# Patient Record
Sex: MALE | Race: BLACK OR AFRICAN AMERICAN | NOT HISPANIC OR LATINO | URBAN - METROPOLITAN AREA
[De-identification: names, ages, dates, MRNs, and addresses within clinical notes are randomized per-mention and may not be internally consistent; named-entity substitution may affect disease eponyms.]

---

## 2023-07-27 ENCOUNTER — INPATIENT (INPATIENT)
Facility: HOSPITAL | Age: 48
LOS: 5 days | Discharge: HOME HEALTH | DRG: 477 | End: 2023-08-02
Attending: HOSPITALIST | Admitting: INTERNAL MEDICINE
Payer: MEDICAID

## 2023-07-27 VITALS
TEMPERATURE: 98 F | HEIGHT: 78 IN | HEART RATE: 102 BPM | WEIGHT: 250 LBS | RESPIRATION RATE: 18 BRPM | OXYGEN SATURATION: 96 % | SYSTOLIC BLOOD PRESSURE: 96 MMHG | DIASTOLIC BLOOD PRESSURE: 61 MMHG

## 2023-07-27 LAB
ALBUMIN SERPL ELPH-MCNC: 3.1 G/DL — LOW (ref 3.4–5)
ALP SERPL-CCNC: 95 U/L — SIGNIFICANT CHANGE UP (ref 40–120)
ALT FLD-CCNC: 10 U/L — LOW (ref 12–42)
ANION GAP SERPL CALC-SCNC: 15 MMOL/L — SIGNIFICANT CHANGE UP (ref 9–16)
AST SERPL-CCNC: 21 U/L — SIGNIFICANT CHANGE UP (ref 15–37)
BASOPHILS # BLD AUTO: 0.04 K/UL — SIGNIFICANT CHANGE UP (ref 0–0.2)
BASOPHILS NFR BLD AUTO: 0.4 % — SIGNIFICANT CHANGE UP (ref 0–2)
BILIRUB SERPL-MCNC: 0.7 MG/DL — SIGNIFICANT CHANGE UP (ref 0.2–1.2)
BUN SERPL-MCNC: 25 MG/DL — HIGH (ref 7–23)
CALCIUM SERPL-MCNC: 8.9 MG/DL — SIGNIFICANT CHANGE UP (ref 8.5–10.5)
CHLORIDE SERPL-SCNC: 102 MMOL/L — SIGNIFICANT CHANGE UP (ref 96–108)
CO2 SERPL-SCNC: 18 MMOL/L — LOW (ref 22–31)
CREAT SERPL-MCNC: 1.66 MG/DL — HIGH (ref 0.5–1.3)
CRP SERPL-MCNC: 32 MG/L — HIGH (ref 0–9)
EGFR: 50 ML/MIN/1.73M2 — LOW
EOSINOPHIL # BLD AUTO: 0.11 K/UL — SIGNIFICANT CHANGE UP (ref 0–0.5)
EOSINOPHIL NFR BLD AUTO: 1.2 % — SIGNIFICANT CHANGE UP (ref 0–6)
GLUCOSE SERPL-MCNC: 237 MG/DL — HIGH (ref 70–99)
HCT VFR BLD CALC: 35.9 % — LOW (ref 39–50)
HGB BLD-MCNC: 12.3 G/DL — LOW (ref 13–17)
IMM GRANULOCYTES NFR BLD AUTO: 0.3 % — SIGNIFICANT CHANGE UP (ref 0–0.9)
LACTATE BLDV-MCNC: 1.6 MMOL/L — SIGNIFICANT CHANGE UP (ref 0.5–2)
LYMPHOCYTES # BLD AUTO: 2.35 K/UL — SIGNIFICANT CHANGE UP (ref 1–3.3)
LYMPHOCYTES # BLD AUTO: 26.4 % — SIGNIFICANT CHANGE UP (ref 13–44)
MCHC RBC-ENTMCNC: 31.7 PG — SIGNIFICANT CHANGE UP (ref 27–34)
MCHC RBC-ENTMCNC: 34.3 GM/DL — SIGNIFICANT CHANGE UP (ref 32–36)
MCV RBC AUTO: 92.5 FL — SIGNIFICANT CHANGE UP (ref 80–100)
MONOCYTES # BLD AUTO: 0.7 K/UL — SIGNIFICANT CHANGE UP (ref 0–0.9)
MONOCYTES NFR BLD AUTO: 7.9 % — SIGNIFICANT CHANGE UP (ref 2–14)
NEUTROPHILS # BLD AUTO: 5.67 K/UL — SIGNIFICANT CHANGE UP (ref 1.8–7.4)
NEUTROPHILS NFR BLD AUTO: 63.8 % — SIGNIFICANT CHANGE UP (ref 43–77)
NRBC # BLD: 0 /100 WBCS — SIGNIFICANT CHANGE UP (ref 0–0)
PLATELET # BLD AUTO: 217 K/UL — SIGNIFICANT CHANGE UP (ref 150–400)
POTASSIUM SERPL-MCNC: 4.5 MMOL/L — SIGNIFICANT CHANGE UP (ref 3.5–5.3)
POTASSIUM SERPL-SCNC: 4.5 MMOL/L — SIGNIFICANT CHANGE UP (ref 3.5–5.3)
PROT SERPL-MCNC: 8.5 G/DL — HIGH (ref 6.4–8.2)
RBC # BLD: 3.88 M/UL — LOW (ref 4.2–5.8)
RBC # FLD: 11.9 % — SIGNIFICANT CHANGE UP (ref 10.3–14.5)
SODIUM SERPL-SCNC: 135 MMOL/L — SIGNIFICANT CHANGE UP (ref 132–145)
WBC # BLD: 8.9 K/UL — SIGNIFICANT CHANGE UP (ref 3.8–10.5)
WBC # FLD AUTO: 8.9 K/UL — SIGNIFICANT CHANGE UP (ref 3.8–10.5)

## 2023-07-27 PROCEDURE — 73630 X-RAY EXAM OF FOOT: CPT | Mod: 26,RT

## 2023-07-27 PROCEDURE — 99223 1ST HOSP IP/OBS HIGH 75: CPT

## 2023-07-27 PROCEDURE — 93971 EXTREMITY STUDY: CPT | Mod: 26,RT

## 2023-07-27 PROCEDURE — 99285 EMERGENCY DEPT VISIT HI MDM: CPT

## 2023-07-27 RX ORDER — PIPERACILLIN AND TAZOBACTAM 4; .5 G/20ML; G/20ML
4.5 INJECTION, POWDER, LYOPHILIZED, FOR SOLUTION INTRAVENOUS ONCE
Refills: 0 | Status: COMPLETED | OUTPATIENT
Start: 2023-07-27 | End: 2023-07-27

## 2023-07-27 RX ORDER — SODIUM CHLORIDE 9 MG/ML
1000 INJECTION, SOLUTION INTRAVENOUS
Refills: 0 | Status: DISCONTINUED | OUTPATIENT
Start: 2023-07-27 | End: 2023-08-02

## 2023-07-27 RX ORDER — DEXTROSE 50 % IN WATER 50 %
15 SYRINGE (ML) INTRAVENOUS ONCE
Refills: 0 | Status: DISCONTINUED | OUTPATIENT
Start: 2023-07-27 | End: 2023-08-02

## 2023-07-27 RX ORDER — ACETAMINOPHEN 500 MG
650 TABLET ORAL ONCE
Refills: 0 | Status: COMPLETED | OUTPATIENT
Start: 2023-07-27 | End: 2023-07-27

## 2023-07-27 RX ORDER — DEXTROSE 50 % IN WATER 50 %
12.5 SYRINGE (ML) INTRAVENOUS ONCE
Refills: 0 | Status: DISCONTINUED | OUTPATIENT
Start: 2023-07-27 | End: 2023-08-02

## 2023-07-27 RX ORDER — SODIUM CHLORIDE 9 MG/ML
2800 INJECTION INTRAMUSCULAR; INTRAVENOUS; SUBCUTANEOUS ONCE
Refills: 0 | Status: COMPLETED | OUTPATIENT
Start: 2023-07-27 | End: 2023-07-27

## 2023-07-27 RX ORDER — DEXTROSE 50 % IN WATER 50 %
25 SYRINGE (ML) INTRAVENOUS ONCE
Refills: 0 | Status: DISCONTINUED | OUTPATIENT
Start: 2023-07-27 | End: 2023-08-02

## 2023-07-27 RX ORDER — KETOROLAC TROMETHAMINE 30 MG/ML
15 SYRINGE (ML) INJECTION ONCE
Refills: 0 | Status: DISCONTINUED | OUTPATIENT
Start: 2023-07-27 | End: 2023-07-27

## 2023-07-27 RX ORDER — INSULIN GLARGINE 100 [IU]/ML
50 INJECTION, SOLUTION SUBCUTANEOUS AT BEDTIME
Refills: 0 | Status: DISCONTINUED | OUTPATIENT
Start: 2023-07-27 | End: 2023-07-29

## 2023-07-27 RX ORDER — INSULIN LISPRO 100/ML
VIAL (ML) SUBCUTANEOUS
Refills: 0 | Status: DISCONTINUED | OUTPATIENT
Start: 2023-07-27 | End: 2023-08-02

## 2023-07-27 RX ORDER — VANCOMYCIN HCL 1 G
1500 VIAL (EA) INTRAVENOUS ONCE
Refills: 0 | Status: COMPLETED | OUTPATIENT
Start: 2023-07-27 | End: 2023-07-27

## 2023-07-27 RX ORDER — GLUCAGON INJECTION, SOLUTION 0.5 MG/.1ML
1 INJECTION, SOLUTION SUBCUTANEOUS ONCE
Refills: 0 | Status: DISCONTINUED | OUTPATIENT
Start: 2023-07-27 | End: 2023-08-02

## 2023-07-27 RX ORDER — LOSARTAN POTASSIUM 100 MG/1
25 TABLET, FILM COATED ORAL ONCE
Refills: 0 | Status: COMPLETED | OUTPATIENT
Start: 2023-07-27 | End: 2023-07-27

## 2023-07-27 RX ORDER — LOSARTAN POTASSIUM 100 MG/1
25 TABLET, FILM COATED ORAL EVERY 24 HOURS
Refills: 0 | Status: DISCONTINUED | OUTPATIENT
Start: 2023-07-28 | End: 2023-07-31

## 2023-07-27 RX ADMIN — Medication 15 MILLIGRAM(S): at 15:02

## 2023-07-27 RX ADMIN — SODIUM CHLORIDE 2800 MILLILITER(S): 9 INJECTION INTRAMUSCULAR; INTRAVENOUS; SUBCUTANEOUS at 15:01

## 2023-07-27 RX ADMIN — Medication 300 MILLIGRAM(S): at 16:18

## 2023-07-27 RX ADMIN — PIPERACILLIN AND TAZOBACTAM 200 GRAM(S): 4; .5 INJECTION, POWDER, LYOPHILIZED, FOR SOLUTION INTRAVENOUS at 15:39

## 2023-07-27 RX ADMIN — Medication 650 MILLIGRAM(S): at 15:01

## 2023-07-27 NOTE — ED ADULT NURSE REASSESSMENT NOTE - NS ED NURSE REASSESS COMMENT FT1
Dr. Law updated on pt's BP. Pt denies hx of HTN, denies pain at this time. Pt states he's upset about being in hospital.

## 2023-07-27 NOTE — H&P ADULT - ASSESSMENT
8M with pmhx of HIV (compliant with Biktarvy), partial great toe amputation in Jan 2022, recent R great toe OM in May (s/p 6 week course of Cefepime and Vancomycin), DM, HTN who presents for a 1 day history of right foot pain and swelling admitted for R toe osteomyelitis.  48M with pmhx of HIV (compliant with Biktarvy), partial great toe amputation in Jan 2022, recent R great toe OM in May (s/p 6 week course of Cefepime and Vancomycin), DM, HTN who presents for a 1 day history of right foot pain and swelling admitted for R toe osteomyelitis.

## 2023-07-27 NOTE — H&P ADULT - NSHPPHYSICALEXAM_GEN_ALL_CORE
.  VITAL SIGNS:  T(C): 36.7 (07-27-23 @ 23:13), Max: 36.9 (07-27-23 @ 11:43)  T(F): 98.1 (07-27-23 @ 23:13), Max: 98.4 (07-27-23 @ 11:43)  HR: 87 (07-28-23 @ 00:19) (80 - 102)  BP: 152/86 (07-28-23 @ 00:19) (96/61 - 178/96)  BP(mean): --  RR: 18 (07-27-23 @ 23:13) (16 - 18)  SpO2: 99% (07-27-23 @ 23:13) (96% - 99%)  Wt(kg): --    PHYSICAL EXAM:    Constitutional: WDWN resting comfortably in bed; NAD  Head: NC/AT  Eyes: PERRL, EOMI  ENT: no nasal discharge; no oropharyngeal erythema or exudates; MMM  Neck: supple; no JVD  Respiratory: CTA B/L; no W/R/R  Cardiac: +S1/S2; RRR; no M/R/G  Gastrointestinal: abdomen soft, NT/ND; no rebound or guarding; +BSx4  Extremities: WWP, no peripheral edema. distal R great toe s/p amputation. 3cm wound to the plantar aspect, no purulence, mild erythema  Musculoskeletal: NROM x4  Vascular: 2+ radial, 1+ DP/PT pulses B/L  Neurologic: AAOx3; CNII-XII grossly intact; no focal deficits  Psychiatric: affect and characteristics of appearance, verbalizations, behaviors are appropriate

## 2023-07-27 NOTE — ED PROVIDER NOTE - PROGRESS NOTE DETAILS
XR R foot with bony erosion of great toe consistent with likely osteo.  CRP elevated.  Pt on vanc/zosyn.  Will admit to medicine at .

## 2023-07-27 NOTE — H&P ADULT - ATTENDING COMMENTS
47 yo M with PMHx DM, HIV, HTN, HLD, s/p partial R toe amputation (c/b OM 5/2023) px to [facility] from home with worsening R 1st digit ulcer of 3 month duration s/p Bactrim outpatient admitted with concern for possible osteomyelitis.     #R toe osteomyelitis – Only meeting 1/4 SIRS for HR. CRP elevated to 32. Recent tx of OM completed with Bactrim 1 week prior. On my exam, 2x2 cm circular ulcer on plantar aspect of R 1st digit without drainage or visible bone however XR R foot showing erosive changes of the proximal phalanx concerning for osteomyelitis. Like chronic osteomyelitis. Podiatry consulted. F/U MR R-foot. Continue Vanc/Zosyn pending further podiatry recommendations. F/U ESR/CRP with next labs.      Agree with remainder of resident plan as above.

## 2023-07-27 NOTE — ED ADULT NURSE NOTE - OBJECTIVE STATEMENT
Pt with wound/infection to bottom of right 1st toe. Reports he has been on multiple rounds of abx requiring a PICC line for long term meds.

## 2023-07-27 NOTE — ED PROVIDER NOTE - PHYSICAL EXAMINATION
Constitutional: awake and alert, in no acute distress  HEENT: head normocephalic and atraumatic. moist mucous membranes  Eyes: extraocular movements intact, normal conjunctiva  Neck: supple, normal ROM  Cardiovascular: regular rate   Pulmonary: no respiratory distress  Gastrointestinal: abdomen flat and nondistended  Skin: warm, dry, normal for ethnicity  Musculoskeletal: erythema, swelling, and warmth of right lower extremity to mid-calf. 3 cm x 3 cm ulcer to base of right great toe stub without foul smelling discharge. S/p partial right great toe amputation.  Neurological: oriented x4, no focal neurologic deficit.   Psychiatric: calm and cooperative

## 2023-07-27 NOTE — PATIENT PROFILE ADULT - FALL HARM RISK - UNIVERSAL INTERVENTIONS
Bed in lowest position, wheels locked, appropriate side rails in place/Call bell, personal items and telephone in reach/Instruct patient to call for assistance before getting out of bed or chair/Non-slip footwear when patient is out of bed/[place] to call system/Physically safe environment - no spills, clutter or unnecessary equipment/Purposeful Proactive Rounding/Room/bathroom lighting operational, light cord in reach

## 2023-07-27 NOTE — H&P ADULT - NSHPLABSRESULTS_GEN_ALL_CORE
.  LABS:                         12.3   8.90  )-----------( 217      ( 27 Jul 2023 13:50 )             35.9     07-27    135  |  102  |  25<H>  ----------------------------<  237<H>  4.5   |  18<L>  |  1.66<H>    Ca    8.9      27 Jul 2023 13:50    TPro  8.5<H>  /  Alb  3.1<L>  /  TBili  0.7  /  DBili  x   /  AST  21  /  ALT  10<L>  /  AlkPhos  95  07-27      Urinalysis Basic - ( 27 Jul 2023 13:50 )    Color: x / Appearance: x / SG: x / pH: x  Gluc: 237 mg/dL / Ketone: x  / Bili: x / Urobili: x   Blood: x / Protein: x / Nitrite: x   Leuk Esterase: x / RBC: x / WBC x   Sq Epi: x / Non Sq Epi: x / Bacteria: x            RADIOLOGY, EKG & ADDITIONAL TESTS: Reviewed.

## 2023-07-27 NOTE — H&P ADULT - PROBLEM SELECTOR PLAN 2
Pt reports hx of diabetes, complicated with diabetic neuropathy  -uses trulicity 1.5 and Basaglar 70u at home  -f/u A1c in AM (pt does not rememeber when it was last checked)  -will start Lantus dosing at 50u and increase as needed  -mISS

## 2023-07-27 NOTE — ED PROVIDER NOTE - CLINICAL SUMMARY MEDICAL DECISION MAKING FREE TEXT BOX
49 y/o M with PMHx of HIV (viral load undetectable), history of partial great toe amputation, gout, right great toe osteomyelitis (s/p 6 weeks of IV antibiotics 2-3 months ago) presents with 2 days of right lower extremity swelling, pain, and redness. On exam, pt is afebrile, right lower extremity is erythematous, warm, and swollen compared to the left lower extremity, and right great toe is noted to have a plantar ulcer with clean edges and no foul smelling discharge. Differential includes cellulitis, osteomyelitis, DVT, other. Plan for labs, XR right foot, US right lower extremity, empiric IV antibiotics, IV hydration, analgesia, and reassess.

## 2023-07-27 NOTE — H&P ADULT - HISTORY OF PRESENT ILLNESS
48M with pmhx of HIV (compliant with Biktarvy), partial great toe amputation in Jan 2022, recent R great toe OM in May (s/p 6 week course of Cefepime and Vancomycin), DM, HTN who presents for a 1 day history of right foot pain and swelling. Pt notes he has an open wound to the R great toe that he has had for the past 3 months. He was recently admitted to a hospital in [place] for OM of the R great toe and received a 6 week course of Vanc and Cefepime. Pt reports he then followed up with his podiatrist who completed an additional xray, which showed improvement in the infection. Pt states approximately 2 weeks ago he then developed slight drainage from the wound and followed up with his Podiatrist, who prescribed a 5 day course of Bactrim, which he completed. He then performed wound care himself the past 2 weeks, however two days ago, he said the wound was exposed to water and he subsequently developed pain and swelling, which is what prompted him to come to the ED. Denies fevers, chills, chest pain, shortness of breath, abdominal pain, n/v/d, numbness/tingling in the leg. Denies any bleeding or draining from the wound over the last few days.    In the ED:  Vitals: 98.4,  --> 83, /96, 96% on RA  Labs: WBC 8.90, Hgb 12.3, Cr 1.66, CRP 32, lactate 1.6  Imaging: Xray of R foot: erosive changes of the first proximal phalanx head, concerning for OM  RLE US; negative for DVT  Interventions: Tylenol 650mg PO qD, Toradol 15mg IVP, Zosyn 4.5IV, Vanc 1500mg IV 48M with pmhx of HIV (compliant with Biktarvy), partial great toe amputation in Jan 2022, recent R great toe OM in May (s/p 6 week course of Cefepime and Vancomycin), DM, HTN who presents for a 1 day history of right foot pain and swelling. Pt notes he has an open wound to the R great toe that he has had for the past 3 months. He was recently admitted to a hospital in [place] for OM of the R great toe and received a 6 week course of Vanc and Cefepime. Pt reports he then followed up with his podiatrist who completed an additional xray, which showed improvement in the infection. Pt states approximately 2 weeks ago he then developed slight drainage from the wound and followed up with his Podiatrist, who prescribed a 5 day course of Bactrim, which he completed. He then performed wound care himself the past 2 weeks, however two days ago, he said the wound was exposed to water and he subsequently developed pain and swelling, which is what prompted him to come to the ED. Denies fevers, chills, chest pain, shortness of breath, abdominal pain, n/v/d, numbness/tingling in the leg. Denies any bleeding or draining from the wound over the last few days.    In the ED:  Vitals: 98.4,  --> 83, /96, 96% on RA  Labs: WBC 8.90, Hgb 12.3, Cr 1.66, CRP 32, lactate 1.6  Imaging: Xray of R foot: erosive changes of the first proximal phalanx head, concerning for OM  RLE US; negative for DVT  Interventions: Tylenol 650mg PO qD, Toradol 15mg IVP, Zosyn 4.5IV, Vanc 1500mg IV, NS 2.8L

## 2023-07-27 NOTE — H&P ADULT - PROBLEM SELECTOR PLAN 5
c/w Atorvastatin 20mg PO qD  -pt states he has been using a baby aspirin for many years, no known CAD. will continue inpatient

## 2023-07-27 NOTE — ED ADULT NURSE REASSESSMENT NOTE - NS ED NURSE REASSESS COMMENT FT1
Unable to obtain blood cultures; pt stuck multiple times by 2 RN's. Dr. Law notified ok with starting abx without blood cultures.

## 2023-07-27 NOTE — H&P ADULT - NSHPSOCIALHISTORY_GEN_ALL_CORE
No tobacco or illicit drug use  social alcohol use  lives at home with his mother  independent with all ADLs

## 2023-07-27 NOTE — ED ADULT TRIAGE NOTE - CHIEF COMPLAINT QUOTE
here for right lower leg pain- h/o recent partial amputation to right big toe 2 months ago- h/o dm, gout, HIV, htn and hld- states it feels like his gout

## 2023-07-27 NOTE — H&P ADULT - PROBLEM SELECTOR PLAN 3
pt reports compliance with Biktarvy, continue while inpatient  states his VL and CD4 count was last checked 3 weeks ago, patient will check for outpatient labs on the portal
Pt complaining of covid-19 infection.  Pt on day 10 of symptoms including shortness of breath, cough, body aches, mild diarrhea.  Presents today for worsening shortness of breath and cough, monitoring at home on spo2 monitor reporting 92@ at home which is consistent with readings in ER.  Pt is conversive, no overt respiratory distress, abdomen soft/non-distended/non-tender, skin warm, dry, intact, denies chest pain, abdominal pain, nausea, vomiting, urinary symptoms, falls.  Dad also in ER with covid infection.  Non-vaccinated.

## 2023-07-27 NOTE — H&P ADULT - PROBLEM SELECTOR PLAN 1
Pt presenting with a 1 day history of swelling and pain to the R toe. Reports hx of OM of R great toe (has 3 month history of open wound to the toe) and completed 6 week course of antibiotics mid June (Vanc/Cefepime). Followed with a podiatrist, who reported improvement per x-ray, however 2 weeks ago again started to have purulence/swelling and was given 5 day course of Bactrim  -does not meet SIRS criteria on admission  -s/p Vanc and Zosyn in the ED. Xray with concern for OM of proximal phalanx head  -podiatry consulted, f/u recommendations  -MR with IV contrast of R foot ordered  -c/w Vanc and Zosyn (pseudomonas dosing given hx of DM) Pt presenting with a 1 day history of swelling and pain to the R toe. Reports hx of OM of R great toe (has 3 month history of open wound to the toe) and completed 6 week course of antibiotics mid June (Vanc/Cefepime). Followed with a podiatrist, who reported improvement per x-ray, however 2 weeks ago again started to have purulence/swelling and was given 5 day course of Bactrim  -does not meet SIRS criteria on admission  -s/p Vanc and Zosyn in the ED. Xray with concern for OM of proximal phalanx head  -podiatry consulted, f/u recommendations  -f/u ESR and CRP in AM  -MR with IV contrast of R foot ordered  -c/w Vanc and Zosyn (pseudomonas dosing given hx of DM)

## 2023-07-27 NOTE — ED ADULT NURSE NOTE - NSFALLUNIVINTERV_ED_ALL_ED
Bed/Stretcher in lowest position, wheels locked, appropriate side rails in place/Call bell, personal items and telephone in reach/Instruct patient to call for assistance before getting out of bed/chair/stretcher/Non-slip footwear applied when patient is off stretcher/[place] to call system/Physically safe environment - no spills, clutter or unnecessary equipment/Purposeful proactive rounding/Room/bathroom lighting operational, light cord in reach

## 2023-07-27 NOTE — ED PROVIDER NOTE - NS ED ROS FT
Constitutional:  No fever, No chills, No night sweats  Eyes:  No visual changes, No discharge, No redness  ENMT:  No epistaxis, no nasal congestion, no throat pain, no difficulty swallowing  CV:  No chest pain, No palpitations, No peripheral edema  Resp:  No cough, No shortness of breath  GI:  No abdominal pain, No vomiting, No diarrhea  MSK:  No neck pain or stiffness, No joint swelling or pain, No back pain  Neuro: no loss of consciousness, no gait abnormality, no headache, no sensory deficits, and no weakness.  Skin: Right lower extremity swelling, pain, redness. Right great toe ulcer. No abrasions  Psych:  No known mental health issues

## 2023-07-28 DIAGNOSIS — M86.9 OSTEOMYELITIS, UNSPECIFIED: ICD-10-CM

## 2023-07-28 DIAGNOSIS — E78.5 HYPERLIPIDEMIA, UNSPECIFIED: ICD-10-CM

## 2023-07-28 DIAGNOSIS — E11.65 TYPE 2 DIABETES MELLITUS WITH HYPERGLYCEMIA: ICD-10-CM

## 2023-07-28 DIAGNOSIS — E11.9 TYPE 2 DIABETES MELLITUS WITHOUT COMPLICATIONS: ICD-10-CM

## 2023-07-28 DIAGNOSIS — B20 HUMAN IMMUNODEFICIENCY VIRUS [HIV] DISEASE: ICD-10-CM

## 2023-07-28 DIAGNOSIS — N17.9 ACUTE KIDNEY FAILURE, UNSPECIFIED: ICD-10-CM

## 2023-07-28 DIAGNOSIS — Z29.9 ENCOUNTER FOR PROPHYLACTIC MEASURES, UNSPECIFIED: ICD-10-CM

## 2023-07-28 DIAGNOSIS — I10 ESSENTIAL (PRIMARY) HYPERTENSION: ICD-10-CM

## 2023-07-28 LAB
A1C WITH ESTIMATED AVERAGE GLUCOSE RESULT: 7.2 % — HIGH (ref 4–5.6)
ALBUMIN SERPL ELPH-MCNC: 3 G/DL — LOW (ref 3.3–5)
ALP SERPL-CCNC: 80 U/L — SIGNIFICANT CHANGE UP (ref 40–120)
ALT FLD-CCNC: 8 U/L — LOW (ref 10–45)
ANION GAP SERPL CALC-SCNC: 6 MMOL/L — SIGNIFICANT CHANGE UP (ref 5–17)
AST SERPL-CCNC: 12 U/L — SIGNIFICANT CHANGE UP (ref 10–40)
BASOPHILS # BLD AUTO: 0.04 K/UL — SIGNIFICANT CHANGE UP (ref 0–0.2)
BASOPHILS NFR BLD AUTO: 0.7 % — SIGNIFICANT CHANGE UP (ref 0–2)
BILIRUB SERPL-MCNC: 0.7 MG/DL — SIGNIFICANT CHANGE UP (ref 0.2–1.2)
BUN SERPL-MCNC: 18 MG/DL — SIGNIFICANT CHANGE UP (ref 7–23)
CALCIUM SERPL-MCNC: 8.2 MG/DL — LOW (ref 8.4–10.5)
CHLORIDE SERPL-SCNC: 104 MMOL/L — SIGNIFICANT CHANGE UP (ref 96–108)
CO2 SERPL-SCNC: 24 MMOL/L — SIGNIFICANT CHANGE UP (ref 22–31)
CREAT SERPL-MCNC: 1.36 MG/DL — HIGH (ref 0.5–1.3)
CRP SERPL-MCNC: 44.6 MG/L — HIGH (ref 0–4)
EGFR: 64 ML/MIN/1.73M2 — SIGNIFICANT CHANGE UP
EOSINOPHIL # BLD AUTO: 0.18 K/UL — SIGNIFICANT CHANGE UP (ref 0–0.5)
EOSINOPHIL NFR BLD AUTO: 3.2 % — SIGNIFICANT CHANGE UP (ref 0–6)
ERYTHROCYTE [SEDIMENTATION RATE] IN BLOOD: 51 MM/HR — HIGH (ref 0–15)
ERYTHROCYTE [SEDIMENTATION RATE] IN BLOOD: 56 MM/HR — HIGH
ESTIMATED AVERAGE GLUCOSE: 160 MG/DL — HIGH (ref 68–114)
GLUCOSE BLDC GLUCOMTR-MCNC: 122 MG/DL — HIGH (ref 70–99)
GLUCOSE BLDC GLUCOMTR-MCNC: 141 MG/DL — HIGH (ref 70–99)
GLUCOSE BLDC GLUCOMTR-MCNC: 168 MG/DL — HIGH (ref 70–99)
GLUCOSE BLDC GLUCOMTR-MCNC: 173 MG/DL — HIGH (ref 70–99)
GLUCOSE BLDC GLUCOMTR-MCNC: 221 MG/DL — HIGH (ref 70–99)
GLUCOSE BLDC GLUCOMTR-MCNC: 243 MG/DL — HIGH (ref 70–99)
GLUCOSE SERPL-MCNC: 192 MG/DL — HIGH (ref 70–99)
HCT VFR BLD CALC: 33.2 % — LOW (ref 39–50)
HGB BLD-MCNC: 11.9 G/DL — LOW (ref 13–17)
IMM GRANULOCYTES NFR BLD AUTO: 0.4 % — SIGNIFICANT CHANGE UP (ref 0–0.9)
LYMPHOCYTES # BLD AUTO: 1.77 K/UL — SIGNIFICANT CHANGE UP (ref 1–3.3)
LYMPHOCYTES # BLD AUTO: 31.4 % — SIGNIFICANT CHANGE UP (ref 13–44)
MAGNESIUM SERPL-MCNC: 2 MG/DL — SIGNIFICANT CHANGE UP (ref 1.6–2.6)
MCHC RBC-ENTMCNC: 32.7 PG — SIGNIFICANT CHANGE UP (ref 27–34)
MCHC RBC-ENTMCNC: 35.8 GM/DL — SIGNIFICANT CHANGE UP (ref 32–36)
MCV RBC AUTO: 91.2 FL — SIGNIFICANT CHANGE UP (ref 80–100)
MONOCYTES # BLD AUTO: 0.46 K/UL — SIGNIFICANT CHANGE UP (ref 0–0.9)
MONOCYTES NFR BLD AUTO: 8.2 % — SIGNIFICANT CHANGE UP (ref 2–14)
NEUTROPHILS # BLD AUTO: 3.17 K/UL — SIGNIFICANT CHANGE UP (ref 1.8–7.4)
NEUTROPHILS NFR BLD AUTO: 56.1 % — SIGNIFICANT CHANGE UP (ref 43–77)
NRBC # BLD: 0 /100 WBCS — SIGNIFICANT CHANGE UP (ref 0–0)
PHOSPHATE SERPL-MCNC: 2.9 MG/DL — SIGNIFICANT CHANGE UP (ref 2.5–4.5)
PLATELET # BLD AUTO: 230 K/UL — SIGNIFICANT CHANGE UP (ref 150–400)
POTASSIUM SERPL-MCNC: 4.5 MMOL/L — SIGNIFICANT CHANGE UP (ref 3.5–5.3)
POTASSIUM SERPL-SCNC: 4.5 MMOL/L — SIGNIFICANT CHANGE UP (ref 3.5–5.3)
PROT SERPL-MCNC: 7.4 G/DL — SIGNIFICANT CHANGE UP (ref 6–8.3)
RBC # BLD: 3.64 M/UL — LOW (ref 4.2–5.8)
RBC # FLD: 11.8 % — SIGNIFICANT CHANGE UP (ref 10.3–14.5)
SODIUM SERPL-SCNC: 134 MMOL/L — LOW (ref 135–145)
URATE SERPL-MCNC: 7 MG/DL — SIGNIFICANT CHANGE UP (ref 3.4–8.8)
WBC # BLD: 5.64 K/UL — SIGNIFICANT CHANGE UP (ref 3.8–10.5)
WBC # FLD AUTO: 5.64 K/UL — SIGNIFICANT CHANGE UP (ref 3.8–10.5)

## 2023-07-28 PROCEDURE — 99233 SBSQ HOSP IP/OBS HIGH 50: CPT | Mod: GC

## 2023-07-28 RX ORDER — DULAGLUTIDE 4.5 MG/.5ML
1.5 INJECTION, SOLUTION SUBCUTANEOUS
Refills: 0 | DISCHARGE

## 2023-07-28 RX ORDER — BICTEGRAVIR SODIUM, EMTRICITABINE, AND TENOFOVIR ALAFENAMIDE FUMARATE 30; 120; 15 MG/1; MG/1; MG/1
1 TABLET ORAL EVERY 24 HOURS
Refills: 0 | Status: DISCONTINUED | OUTPATIENT
Start: 2023-07-28 | End: 2023-08-02

## 2023-07-28 RX ORDER — BICTEGRAVIR SODIUM, EMTRICITABINE, AND TENOFOVIR ALAFENAMIDE FUMARATE 30; 120; 15 MG/1; MG/1; MG/1
1 TABLET ORAL
Refills: 0 | DISCHARGE

## 2023-07-28 RX ORDER — VANCOMYCIN HCL 1 G
1750 VIAL (EA) INTRAVENOUS EVERY 12 HOURS
Refills: 0 | Status: COMPLETED | OUTPATIENT
Start: 2023-07-28 | End: 2023-07-28

## 2023-07-28 RX ORDER — HEPARIN SODIUM 5000 [USP'U]/ML
5000 INJECTION INTRAVENOUS; SUBCUTANEOUS EVERY 8 HOURS
Refills: 0 | Status: DISCONTINUED | OUTPATIENT
Start: 2023-07-28 | End: 2023-08-02

## 2023-07-28 RX ORDER — ATORVASTATIN CALCIUM 80 MG/1
1 TABLET, FILM COATED ORAL
Refills: 0 | DISCHARGE

## 2023-07-28 RX ORDER — INSULIN GLARGINE 100 [IU]/ML
70 INJECTION, SOLUTION SUBCUTANEOUS
Refills: 0 | DISCHARGE

## 2023-07-28 RX ORDER — ASPIRIN/CALCIUM CARB/MAGNESIUM 324 MG
1 TABLET ORAL
Refills: 0 | DISCHARGE

## 2023-07-28 RX ORDER — ASPIRIN/CALCIUM CARB/MAGNESIUM 324 MG
81 TABLET ORAL EVERY 24 HOURS
Refills: 0 | Status: DISCONTINUED | OUTPATIENT
Start: 2023-07-28 | End: 2023-08-02

## 2023-07-28 RX ORDER — PIPERACILLIN AND TAZOBACTAM 4; .5 G/20ML; G/20ML
4.5 INJECTION, POWDER, LYOPHILIZED, FOR SOLUTION INTRAVENOUS EVERY 8 HOURS
Refills: 0 | Status: DISCONTINUED | OUTPATIENT
Start: 2023-07-28 | End: 2023-07-31

## 2023-07-28 RX ORDER — ACETAMINOPHEN 500 MG
650 TABLET ORAL EVERY 6 HOURS
Refills: 0 | Status: DISCONTINUED | OUTPATIENT
Start: 2023-07-28 | End: 2023-08-02

## 2023-07-28 RX ADMIN — BICTEGRAVIR SODIUM, EMTRICITABINE, AND TENOFOVIR ALAFENAMIDE FUMARATE 1 TABLET(S): 30; 120; 15 TABLET ORAL at 12:54

## 2023-07-28 RX ADMIN — LOSARTAN POTASSIUM 25 MILLIGRAM(S): 100 TABLET, FILM COATED ORAL at 19:48

## 2023-07-28 RX ADMIN — Medication 81 MILLIGRAM(S): at 12:54

## 2023-07-28 RX ADMIN — INSULIN GLARGINE 50 UNIT(S): 100 INJECTION, SOLUTION SUBCUTANEOUS at 23:13

## 2023-07-28 RX ADMIN — Medication 250 MILLIGRAM(S): at 19:47

## 2023-07-28 RX ADMIN — PIPERACILLIN AND TAZOBACTAM 25 GRAM(S): 4; .5 INJECTION, POWDER, LYOPHILIZED, FOR SOLUTION INTRAVENOUS at 12:53

## 2023-07-28 RX ADMIN — HEPARIN SODIUM 5000 UNIT(S): 5000 INJECTION INTRAVENOUS; SUBCUTANEOUS at 19:48

## 2023-07-28 RX ADMIN — PIPERACILLIN AND TAZOBACTAM 25 GRAM(S): 4; .5 INJECTION, POWDER, LYOPHILIZED, FOR SOLUTION INTRAVENOUS at 02:12

## 2023-07-28 RX ADMIN — Medication 250 MILLIGRAM(S): at 06:34

## 2023-07-28 RX ADMIN — Medication 2: at 19:19

## 2023-07-28 RX ADMIN — INSULIN GLARGINE 50 UNIT(S): 100 INJECTION, SOLUTION SUBCUTANEOUS at 00:45

## 2023-07-28 RX ADMIN — PIPERACILLIN AND TAZOBACTAM 25 GRAM(S): 4; .5 INJECTION, POWDER, LYOPHILIZED, FOR SOLUTION INTRAVENOUS at 23:13

## 2023-07-28 RX ADMIN — HEPARIN SODIUM 5000 UNIT(S): 5000 INJECTION INTRAVENOUS; SUBCUTANEOUS at 12:54

## 2023-07-28 RX ADMIN — LOSARTAN POTASSIUM 25 MILLIGRAM(S): 100 TABLET, FILM COATED ORAL at 00:34

## 2023-07-28 NOTE — PROGRESS NOTE ADULT - ASSESSMENT
48M with pmhx of HIV (compliant with Biktarvy), partial great toe amputation in Jan 2022, recent R great toe OM in May (s/p 6 week course of Cefepime and Vancomycin), DM, HTN who presents for a 1 day history of right foot pain and swelling admitted for R toe osteomyelitis.

## 2023-07-28 NOTE — PROGRESS NOTE ADULT - SUBJECTIVE AND OBJECTIVE BOX
OVERNIGHT EVENTS:    SUBJECTIVE / INTERVAL HPI: Patient seen and examined at bedside.     VITAL SIGNS:  Vital Signs Last 24 Hrs  T(C): 36.8 (28 Jul 2023 05:33), Max: 36.9 (27 Jul 2023 11:43)  T(F): 98.3 (28 Jul 2023 05:33), Max: 98.4 (27 Jul 2023 11:43)  HR: 89 (28 Jul 2023 05:33) (80 - 102)  BP: 141/93 (28 Jul 2023 05:33) (96/61 - 178/96)  BP(mean): --  RR: 18 (28 Jul 2023 05:33) (16 - 18)  SpO2: 98% (28 Jul 2023 05:33) (96% - 99%)    Parameters below as of 27 Jul 2023 23:13  Patient On (Oxygen Delivery Method): room air        PHYSICAL EXAM:    General: NAD  HEENT: NC/AT; PERRL, anicteric sclera; MMM  Neck: supple w/o palpable nodularity  Cardiovascular: +S1/S2; RRR  Respiratory: CTA B/L; no W/R/R  Gastrointestinal: soft, NT/ND; +BSx4  Extremities: WWP; no edema, clubbing or cyanosis; 2x2cm ulcer on plantar surface of R great toe - no drainage, purulence or foul-smelling odor.   Vascular: 2+ radial, 1+ DP/PT pulses B/L  Neurological: AAOx3; no focal deficits    MEDICATIONS:  MEDICATIONS  (STANDING):  aspirin  chewable 81 milliGRAM(s) Oral every 24 hours  bictegravir 50 mG/emtricitabine 200 mG/tenofovir alafenamide 25 mG (BIKTARVY) 1 Tablet(s) Oral every 24 hours  dextrose 5%. 1000 milliLiter(s) (50 mL/Hr) IV Continuous <Continuous>  dextrose 5%. 1000 milliLiter(s) (100 mL/Hr) IV Continuous <Continuous>  dextrose 50% Injectable 25 Gram(s) IV Push once  dextrose 50% Injectable 12.5 Gram(s) IV Push once  dextrose 50% Injectable 25 Gram(s) IV Push once  glucagon  Injectable 1 milliGRAM(s) IntraMuscular once  heparin   Injectable 5000 Unit(s) SubCutaneous every 8 hours  insulin glargine Injectable (LANTUS) 50 Unit(s) SubCutaneous at bedtime  insulin lispro (ADMELOG) corrective regimen sliding scale   SubCutaneous Before meals and at bedtime  losartan 25 milliGRAM(s) Oral every 24 hours  piperacillin/tazobactam IVPB.. 4.5 Gram(s) IV Intermittent every 8 hours  vancomycin  IVPB 1750 milliGRAM(s) IV Intermittent every 12 hours    MEDICATIONS  (PRN):  acetaminophen     Tablet .. 650 milliGRAM(s) Oral every 6 hours PRN Mild Pain (1 - 3)  dextrose Oral Gel 15 Gram(s) Oral once PRN Blood Glucose LESS THAN 70 milliGRAM(s)/deciliter      ALLERGIES:  Allergies    shellfish (Short breath)  doxycycline (Diarrhea)    Intolerances        LABS:                        11.9   5.64  )-----------( 230      ( 28 Jul 2023 05:30 )             33.2     07-28    134<L>  |  104  |  18  ----------------------------<  192<H>  4.5   |  24  |  1.36<H>    Ca    8.2<L>      28 Jul 2023 05:30  Phos  2.9     07-28  Mg     2.0     07-28    TPro  7.4  /  Alb  3.0<L>  /  TBili  0.7  /  DBili  x   /  AST  12  /  ALT  8<L>  /  AlkPhos  80  07-28      Urinalysis Basic - ( 28 Jul 2023 05:30 )    Color: x / Appearance: x / SG: x / pH: x  Gluc: 192 mg/dL / Ketone: x  / Bili: x / Urobili: x   Blood: x / Protein: x / Nitrite: x   Leuk Esterase: x / RBC: x / WBC x   Sq Epi: x / Non Sq Epi: x / Bacteria: x      CAPILLARY BLOOD GLUCOSE      POCT Blood Glucose.: 243 mg/dL (28 Jul 2023 00:24)      RADIOLOGY & ADDITIONAL TESTS: Reviewed.   OVERNIGHT EVENTS: No acute events overnight.     SUBJECTIVE / INTERVAL HPI: Patient seen and examined at bedside. He denies any foot pain, shortness of breath, abdominal pain.    VITAL SIGNS:  Vital Signs Last 24 Hrs  T(C): 36.8 (28 Jul 2023 05:33), Max: 36.9 (27 Jul 2023 11:43)  T(F): 98.3 (28 Jul 2023 05:33), Max: 98.4 (27 Jul 2023 11:43)  HR: 89 (28 Jul 2023 05:33) (80 - 102)  BP: 141/93 (28 Jul 2023 05:33) (96/61 - 178/96)  BP(mean): --  RR: 18 (28 Jul 2023 05:33) (16 - 18)  SpO2: 98% (28 Jul 2023 05:33) (96% - 99%)    Parameters below as of 27 Jul 2023 23:13  Patient On (Oxygen Delivery Method): room air        PHYSICAL EXAM:    General: NAD  HEENT: NC/AT; PERRL, anicteric sclera; MMM  Neck: supple w/o palpable nodularity  Cardiovascular: +S1/S2; RRR  Respiratory: CTA B/L; no W/R/R  Gastrointestinal: soft, NT/ND; +BSx4  Extremities: R distal great toe s/p amputation. R proximal great toe wrapped, unable to visualize ulcer. R foot warm, mild edema, no erythema, slight serosanginous drainage. L lower extremity unremarkable.  Vascular: 2+ radial, 1+ DP/PT pulses B/L  Neurological: AAOx3; no focal deficits    MEDICATIONS:  MEDICATIONS  (STANDING):  aspirin  chewable 81 milliGRAM(s) Oral every 24 hours  bictegravir 50 mG/emtricitabine 200 mG/tenofovir alafenamide 25 mG (BIKTARVY) 1 Tablet(s) Oral every 24 hours  dextrose 5%. 1000 milliLiter(s) (50 mL/Hr) IV Continuous <Continuous>  dextrose 5%. 1000 milliLiter(s) (100 mL/Hr) IV Continuous <Continuous>  dextrose 50% Injectable 25 Gram(s) IV Push once  dextrose 50% Injectable 12.5 Gram(s) IV Push once  dextrose 50% Injectable 25 Gram(s) IV Push once  glucagon  Injectable 1 milliGRAM(s) IntraMuscular once  heparin   Injectable 5000 Unit(s) SubCutaneous every 8 hours  insulin glargine Injectable (LANTUS) 50 Unit(s) SubCutaneous at bedtime  insulin lispro (ADMELOG) corrective regimen sliding scale   SubCutaneous Before meals and at bedtime  losartan 25 milliGRAM(s) Oral every 24 hours  piperacillin/tazobactam IVPB.. 4.5 Gram(s) IV Intermittent every 8 hours  vancomycin  IVPB 1750 milliGRAM(s) IV Intermittent every 12 hours    MEDICATIONS  (PRN):  acetaminophen     Tablet .. 650 milliGRAM(s) Oral every 6 hours PRN Mild Pain (1 - 3)  dextrose Oral Gel 15 Gram(s) Oral once PRN Blood Glucose LESS THAN 70 milliGRAM(s)/deciliter      ALLERGIES:  Allergies    shellfish (Short breath)  doxycycline (Diarrhea)    Intolerances        LABS:                        11.9   5.64  )-----------( 230      ( 28 Jul 2023 05:30 )             33.2     07-28    134<L>  |  104  |  18  ----------------------------<  192<H>  4.5   |  24  |  1.36<H>    Ca    8.2<L>      28 Jul 2023 05:30  Phos  2.9     07-28  Mg     2.0     07-28    TPro  7.4  /  Alb  3.0<L>  /  TBili  0.7  /  DBili  x   /  AST  12  /  ALT  8<L>  /  AlkPhos  80  07-28      Urinalysis Basic - ( 28 Jul 2023 05:30 )    Color: x / Appearance: x / SG: x / pH: x  Gluc: 192 mg/dL / Ketone: x  / Bili: x / Urobili: x   Blood: x / Protein: x / Nitrite: x   Leuk Esterase: x / RBC: x / WBC x   Sq Epi: x / Non Sq Epi: x / Bacteria: x      CAPILLARY BLOOD GLUCOSE      POCT Blood Glucose.: 243 mg/dL (28 Jul 2023 00:24)      RADIOLOGY & ADDITIONAL TESTS: Reviewed.   OVERNIGHT EVENTS: No acute events overnight.     SUBJECTIVE / INTERVAL HPI: Patient seen and examined at bedside - no complaints. He denies any foot pain, shortness of breath, abdominal pain.    VITAL SIGNS:  Vital Signs Last 24 Hrs  T(C): 36.8 (28 Jul 2023 05:33), Max: 36.9 (27 Jul 2023 11:43)  T(F): 98.3 (28 Jul 2023 05:33), Max: 98.4 (27 Jul 2023 11:43)  HR: 89 (28 Jul 2023 05:33) (80 - 102)  BP: 141/93 (28 Jul 2023 05:33) (96/61 - 178/96)  BP(mean): --  RR: 18 (28 Jul 2023 05:33) (16 - 18)  SpO2: 98% (28 Jul 2023 05:33) (96% - 99%)    Parameters below as of 27 Jul 2023 23:13  Patient On (Oxygen Delivery Method): room air        PHYSICAL EXAM:    General: NAD  HEENT: NC/AT; anicteric sclera; MMM  Neck: supple w/o palpable nodularity  Cardiovascular: +S1/S2; RRR  Respiratory: CTA B/L; no W/R/R  Gastrointestinal: soft, NT/ND; +BS  Extremities: R distal great toe s/p amputation. R proximal great toe wrapped, unable to visualize ulcer. R foot warm, mild edema, no erythema, slight serosanginous drainage. L lower extremity unremarkable.  Vascular: 2+ radial, 1+ DP/PT pulses B/L  Neurological: AAOx3; no focal deficits    MEDICATIONS:  MEDICATIONS  (STANDING):  aspirin  chewable 81 milliGRAM(s) Oral every 24 hours  bictegravir 50 mG/emtricitabine 200 mG/tenofovir alafenamide 25 mG (BIKTARVY) 1 Tablet(s) Oral every 24 hours  dextrose 5%. 1000 milliLiter(s) (50 mL/Hr) IV Continuous <Continuous>  dextrose 5%. 1000 milliLiter(s) (100 mL/Hr) IV Continuous <Continuous>  dextrose 50% Injectable 25 Gram(s) IV Push once  dextrose 50% Injectable 12.5 Gram(s) IV Push once  dextrose 50% Injectable 25 Gram(s) IV Push once  glucagon  Injectable 1 milliGRAM(s) IntraMuscular once  heparin   Injectable 5000 Unit(s) SubCutaneous every 8 hours  insulin glargine Injectable (LANTUS) 50 Unit(s) SubCutaneous at bedtime  insulin lispro (ADMELOG) corrective regimen sliding scale   SubCutaneous Before meals and at bedtime  losartan 25 milliGRAM(s) Oral every 24 hours  piperacillin/tazobactam IVPB.. 4.5 Gram(s) IV Intermittent every 8 hours  vancomycin  IVPB 1750 milliGRAM(s) IV Intermittent every 12 hours    MEDICATIONS  (PRN):  acetaminophen     Tablet .. 650 milliGRAM(s) Oral every 6 hours PRN Mild Pain (1 - 3)  dextrose Oral Gel 15 Gram(s) Oral once PRN Blood Glucose LESS THAN 70 milliGRAM(s)/deciliter      ALLERGIES:  Allergies    shellfish (Short breath)  doxycycline (Diarrhea)    Intolerances        LABS:                        11.9   5.64  )-----------( 230      ( 28 Jul 2023 05:30 )             33.2     07-28    134<L>  |  104  |  18  ----------------------------<  192<H>  4.5   |  24  |  1.36<H>    Ca    8.2<L>      28 Jul 2023 05:30  Phos  2.9     07-28  Mg     2.0     07-28    TPro  7.4  /  Alb  3.0<L>  /  TBili  0.7  /  DBili  x   /  AST  12  /  ALT  8<L>  /  AlkPhos  80  07-28      Urinalysis Basic - ( 28 Jul 2023 05:30 )    Color: x / Appearance: x / SG: x / pH: x  Gluc: 192 mg/dL / Ketone: x  / Bili: x / Urobili: x   Blood: x / Protein: x / Nitrite: x   Leuk Esterase: x / RBC: x / WBC x   Sq Epi: x / Non Sq Epi: x / Bacteria: x      CAPILLARY BLOOD GLUCOSE      POCT Blood Glucose.: 243 mg/dL (28 Jul 2023 00:24)      RADIOLOGY & ADDITIONAL TESTS: Reviewed.

## 2023-07-28 NOTE — PROGRESS NOTE ADULT - PROBLEM SELECTOR PLAN 7
F: s/p NS in the ED  E: replete K>4 Mg>2  N: consistent carb  D: heparin subq  Dispo: RMF Pt reports hx of diabetes, complicated with diabetic neuropathy  -uses trulicity 1.5 and Basaglar 70u at home  - A1C 7.2%  -on Lantus dosing at 50u and increase as needed  -mISS

## 2023-07-28 NOTE — PROGRESS NOTE ADULT - PROBLEM SELECTOR PLAN 1
Pt presenting with a 1 day history of swelling and pain to the R toe. Reports hx of OM of R great toe (has 3 month history of open wound to the toe) and completed 6 week course of antibiotics mid June (Vanc/Cefepime). Followed with a podiatrist, who reported improvement per x-ray, however 2 weeks ago again started to have purulence/swelling and was given 5 day course of Bactrim  -does not meet SIRS criteria on admission  -s/p Vanc and Zosyn in the ED. Xray with concern for OM of proximal phalanx head  -podiatry consulted, f/u recommendations  -f/u ESR and CRP in AM  -MR with IV contrast of R foot ordered  -c/w Vanc and Zosyn (pseudomonas dosing given hx of DM) Pt presenting with a 1 day history of swelling and pain to the R toe. Reports hx of OM of R great toe (has 3 month history of open wound to the toe) and completed 6 week course of antibiotics mid June (Vanc/Cefepime). Followed with a podiatrist, who reported improvement per x-ray, however 2 weeks ago again started to have purulence/swelling and was given 5 day course of Bactrim. s/p Vanc and Zosyn in the ED - XR concerning for OM.  -did not meet SIRS criteria on admission  -podiatry consulted, recommended MR of R foot, debridement, continue antibiotics  -c/w Vanc and Zosyn (pseudomonas dosing given hx of DM)  - contact [facility] for notes from May 2023 admission Pt presenting with a 1 day history of swelling and pain to the R toe. Reports hx of OM of R great toe (has 3 month history of open wound to the toe) and completed 6 week course of antibiotics mid June (Vanc/Cefepime). Followed with a podiatrist, who reported improvement per x-ray, however 2 weeks ago again started to have purulence/swelling and was given 5 day course of Bactrim. s/p Vanc and Zosyn in the ED - XR concerning for OM.  -did not meet SIRS criteria on admission  -podiatry consulted, recommended MR of R foot, debridement, continue antibiotics  -c/w Vanc and Zosyn (pseudomonas dosing given hx of DM)  - contact [facility] for notes from May 2023 admission to gather culture data and ESR/CRP trends.

## 2023-07-28 NOTE — PHYSICAL THERAPY INITIAL EVALUATION ADULT - ADDITIONAL COMMENTS
Pt lives with his Mother in an elevator access apartment with no JOSHUA. Pt reports to be indpt with all ADLs and IADLs prior to admission. Pt denies use of AD for ambulation.

## 2023-07-28 NOTE — CONSULT NOTE ADULT - SUBJECTIVE AND OBJECTIVE BOX
***incomplete*** Attending: Dr. Fernandez    Patient is a 48y old  Male who presents with a chief complaint of right foot pain (28 Jul 2023 07:37)      HPI:  48M with pmhx of HIV (compliant with Biktarvy), partial great toe amputation in Jan 2022, recent R great toe OM in May (s/p 6 week course of Cefepime and Vancomycin), DM, HTN who presents for a 1 day history of right foot pain and swelling. Pt notes he has an open wound to the R great toe that he has had for the past 3 months. He was recently admitted to a hospital in [place] for OM of the R great toe and received a 6 week course of Vanc and Cefepime. Pt reports he then followed up with his podiatrist who completed an additional xray, which showed improvement in the infection. Pt states approximately 2 weeks ago he then developed slight drainage from the wound and followed up with his Podiatrist, who prescribed a 5 day course of Bactrim, which he completed. He then performed wound care himself the past 2 weeks, however two days ago, he said the wound was exposed to water and he subsequently developed pain and swelling, which is what prompted him to come to the ED. Denies fevers, chills, chest pain, shortness of breath, abdominal pain, n/v/d, numbness/tingling in the leg. Denies any bleeding or draining from the wound over the last few days.    47 y/o M with pmh of HIV, DM, HTN, admitted for R great toe redness and swelling. Has hx of R partial hallux amp in January 2022 for osteomyelitis. He re-developed wound in 4/2023 and was found to have OM which was treated with 6 weeks vanc and cefepime, which he completed. The wound never closed, but he has been doing daily dressing changes with betadine DSD. He had followed up with his podiatrist in [place] 2 weeks ago who noticed worsening soft tissue swelling around the hallux and prescribed him Bactrim for 5 days. Pt stated the swelling did not improve with the Bactrim. He noticed increased swelling and serous drainage from the wound 2 days after getting hsi dressing wet. Denies any n/v/f/c/sob.     Review of systems negative except per HPI and as stated below  General:	 no weakness; no fevers, no chills  Skin/Breast: no rash  Respiratory and Thorax: no SOB, no cough  Cardiovascular:	No chest pain  Gastrointestinal:	 no nausea, vomiting , diarrhea  Genitourinary:	no dysuria, no difficulty urinating, no hematuria  Musculoskeletal:	no weakness, no joint swelling/pain  Neurological:	no focal weakness/numbness  Endocrine:	no polyuria, no polydipsia    PAST MEDICAL & SURGICAL HISTORY:  HIV disease      Gout        Home Medications:  aspirin 81 mg oral capsule: 1 orally once a day (28 Jul 2023 07:00)  atorvastatin 20 mg oral tablet: 1 orally once a day (28 Jul 2023 07:00)  Biktarvy 30 mg-120 mg-15 mg oral tablet: 1 orally once a day (28 Jul 2023 07:00)  Lantus 100 units/mL subcutaneous solution: 70 unit(s) subcutaneous once a day (at bedtime) (28 Jul 2023 07:00)  losartan 25 mg oral tablet: 1 orally once a day (28 Jul 2023 07:00)  Trulicity Pen 1.5 mg/0.5 mL subcutaneous solution: 1.5 subcutaneously once a week (28 Jul 2023 07:00)    Allergies    shellfish (Short breath)  doxycycline (Diarrhea)    Intolerances      FAMILY HISTORY:    Social History:       LABS                        11.9   5.64  )-----------( 230      ( 28 Jul 2023 05:30 )             33.2     07-28    134<L>  |  104  |  18  ----------------------------<  192<H>  4.5   |  24  |  1.36<H>    Ca    8.2<L>      28 Jul 2023 05:30  Phos  2.9     07-28  Mg     2.0     07-28    TPro  7.4  /  Alb  3.0<L>  /  TBili  0.7  /  DBili  x   /  AST  12  /  ALT  8<L>  /  AlkPhos  80  07-28      ESR: 56  CRP: --  07-28 @ 05:30    Vital Signs Last 24 Hrs  T(C): 37.2 (28 Jul 2023 15:15), Max: 37.2 (28 Jul 2023 15:15)  T(F): 99 (28 Jul 2023 15:15), Max: 99 (28 Jul 2023 15:15)  HR: 89 (28 Jul 2023 15:15) (80 - 89)  BP: 149/77 (28 Jul 2023 15:15) (141/93 - 178/96)  BP(mean): --  RR: 18 (28 Jul 2023 15:15) (16 - 18)  SpO2: 97% (28 Jul 2023 15:15) (97% - 99%)    Parameters below as of 28 Jul 2023 15:15  Patient On (Oxygen Delivery Method): room air        PHYSICAL EXAM  General: NAD, AA0x3    Lower Extremity Focused:  Vasc: DP/PT 2/4 b/l, edema to R dorsal foot and R hallux, increased warmth to R dorsal foot  Derm: sub R hallux wound 2.5 x 1.7 x 0.5 cm granular wound with amcerated border, serous drainage, negative PTB, no tracking or tunneling; no open wounds left  Neuro: protective sensation diminished b/l  MSK: Partial hallux amp R    RADIOLOGY    < from: Xray Foot AP + Lateral + Oblique, Right (07.27.23 @ 14:04) >  IMPRESSION:  1.  Erosive changes of the 1st proximal phalanx head concerning for   osteomyelitis.    < end of copied text >

## 2023-07-28 NOTE — PROGRESS NOTE ADULT - PROBLEM SELECTOR PLAN 6
F: s/p NS in the ED  E: replete K>4 Mg>2  N: consistent carb  D: heparin subq  Dispo: [facility]F c/w Atorvastatin 20mg PO qD  -pt states he has been using a baby aspirin for many years, no known CAD. will continue inpatient

## 2023-07-28 NOTE — PROGRESS NOTE ADULT - PROBLEM SELECTOR PLAN 5
c/w Atorvastatin 20mg PO qD  -pt states he has been using a baby aspirin for many years, no known CAD. will continue inpatient uses Losartan 25mg PO qD, continue while inpatient

## 2023-07-28 NOTE — PHYSICAL THERAPY INITIAL EVALUATION ADULT - LEVEL OF INDEPENDENCE, REHAB EVAL
independent
Detail Level: Zone
Plan: Location: face\\nPrescription: Ximino 135mg tablet take one tablet by mouth once daily with food\\n                       Plexion Topical Cleanser lather on face for 2 to 5 minutes then rinse off\\nPharmacy: [facility] \\nDuration: ENROLLING \\nPT HAS DONE ACCUTANE BACK IN HIGH SCHOOL (about ten to twelve years ago)\\nIPLEDGE # [id number]\\nMethods of contraceptives: OBC and MLC\\n\\nNew pt \\nPhotos taken \\n\\nPt comes in today with concern of breakouts and scarring and pigmentation from acne\\nShe states about 12 years ago she did Accutane and she was clear for some time\\nThe acne has returned and her face is very oily and states that the remnants of the acne leaves her scarring \\nShe is also concerned about the dark spots and the uneven tone she has from the acne\\nShe is here for further evaluation and treatment options \\n\\nDiscussed with patient \\nAcne is an immune mediated condition triggered with stress, lack of sleep, and also has a major genetic component\\nShe has adult female acne that occurs when there is a fluctuation of hormones\\nToday I will like to put pt on the following regimen for 6 weeks:\\n1. Ximino 135mg Tablet ( take tablet once daily with food)\\n2. Plexion Topical Cleanser ( lather on face for about 2 to 5 minutes then rinse off)\\nPt will use this regimen for 6 weeks and we will re evaluate at next office visit; I also educated the following for the pt:\\nEducated patient on Accutane 6 month treatment course, side effects, and iPledge program/requirements (2 negative pregnancy tests 30 days apart required)\\nCommon side effects from therapy: dryness, joint pain, mood changes, headaches, nose bleeds\\nDiscussed with patient that Accutane is a Vitamin A derivative\\nDiscussed with patient that Accutane obliterates oil glands and a cure for acne vs. antibiotics which is a temporary treatment\\nDiscussed with patient that medication is processed by the liver and requires blood work to monitor liver functions\\nTODAY WE WILL ENROLL PT ONTO THE IPLEDGE AND AN IN OFFICE PREGNANCY TEST WAS PERFORMED AND RESULTS WERE NEGATIVE.\\nLAB SLIP WAS GIVEN TO PT TO HAVE LABS DRAWN BEFORE NEXT OFFICE VISIT.\\nPT STATES UNDERSTANDING.\\n\\nWill f/u in 6 weeks
Plan: Location: face\\nPrescribed: HQ 4% Tretinoin 0.05% compound cream QHS\\n\\nPt has hyperpigmentation on face today.\\nDiscussed with pt that this pigmentation is due to sun damage to the skin, advised patient to use SPF daily.\\nDiscussed with pt that we will start patient on HQ 4% Tretinoin 0.05% to use nightly.\\nDiscussed with pt to apply a pea size amount to face, pushing into pores. \\nDiscussed with pt to avoid areas around eyes, nasal crease, and around the lips since skin is thin and may get irritated easily.\\n\\nDiscussed with pt that it will come in a box that says keep refrigerated.\\nDiscussed with pt that they do not need to keep it refrigerated, but have to keep it in a dark place since sunlight may oxidize it.\\nDiscussed with pt that if skin becomes irritated while using cream, then pt can change frequency to every other night, or every other other night, etc.\\nDiscussed with pt that results are not immediate and may take up to a month to notice change.\\nAdvised pt to apply a moisturizing cream such as Cerave afterwards to help reestablish a healthy skin barrier.\\nF/u as needed.

## 2023-07-28 NOTE — PROGRESS NOTE ADULT - PROBLEM SELECTOR PLAN 2
Pt reports hx of diabetes, complicated with diabetic neuropathy  -uses trulicity 1.5 and Basaglar 70u at home  -f/u A1c in AM (pt does not rememeber when it was last checked)  -will start Lantus dosing at 50u and increase as needed  -mISS - creatinine 1.66 on admission; downtrended overnight to 1.36  - intrinsic vs. pre-renal origin - possible vancomycin tox?  - patient verbalized that on prior admission for OM, inpatient team was monitoring elevated CK levels while on vanc - creatinine 1.66 on admission; downtrended overnight to 1.36  - intrinsic (vanc tox) vs. pre-renal origin due to hypotension in ED   - patient verbalized that on prior admission for OM, inpatient team was monitoring elevated CK levels while on vanc

## 2023-07-28 NOTE — PHYSICAL THERAPY INITIAL EVALUATION ADULT - PERTINENT HX OF CURRENT PROBLEM, REHAB EVAL
48M with pmhx of HIV (compliant with Biktarvy), partial great toe amputation in Jan 2022, recent R great toe OM in May (s/p 6 week course of Cefepime and Vancomycin), DM, HTN who presents for a 1 day history of right foot pain and swelling. Pt notes he has an open wound to the R great toe that he has had for the past 3 months. He was recently admitted to a hospital in [place] for OM of the R great toe and received a 6 week course of Vanc and Cefepime. Pt reports he then followed up with his podiatrist who completed an additional xray, which showed improvement in the infection. Pt states approximately 2 weeks ago he then developed slight drainage from the wound and followed up with his Podiatrist, who prescribed a 5 day course of Bactrim, which he completed. He then performed wound care himself the past 2 weeks, however two days ago, he said the wound was exposed to water and he subsequently developed pain and swelling, which is what prompted him to come to the ED. Denies fevers, chills, chest pain, shortness of breath, abdominal pain, n/v/d, numbness/tingling in the leg. Denies any bleeding or draining from the wound over the last few days.

## 2023-07-28 NOTE — PROGRESS NOTE ADULT - PROBLEM SELECTOR PLAN 3
pt reports compliance with Biktarvy, continue while inpatient  states his VL and CD4 count was last checked 3 weeks ago, patient will check for outpatient labs on the portal Pt reports hx of diabetes, complicated with diabetic neuropathy  -uses trulicity 1.5 and Basaglar 70u at home  -f/u A1c in AM (pt does not rememeber when it was last checked)  -will start Lantus dosing at 50u and increase as needed  -mISS Pt reports hx of diabetes, complicated with diabetic neuropathy  -uses trulicity 1.5 and Basaglar 70u at home  - A1C 7.2%  -on Lantus dosing at 50u and increase as needed  -mISS pt reports compliance with Biktarvy, continue while inpatient  last VL in April 2023 was undetectable    -obtain admission notes from May hospitalization to confirm CD4 count

## 2023-07-28 NOTE — PROGRESS NOTE ADULT - PROBLEM SELECTOR PLAN 4
uses Losartan 25mg PO qD, continue while inpatient pt reports compliance with Biktarvy, continue while inpatient  last VL in April 2023 was undetectable    -obtain admission notes from May to confirm CD4 count pt reports compliance with Biktarvy, continue while inpatient  last VL in April 2023 was undetectable    -obtain admission notes from May hospitalization to confirm CD4 count

## 2023-07-28 NOTE — CONSULT NOTE ADULT - ASSESSMENT
49 y/o M with omh HIV, DM, HTN admitted for R toe wound. Pt has hx of R partial hallux amp in 1/2022 for osteomyelitis.     Plan:   - Recc MRI to evaluate extent of OM & rule-out abscess. Possible plan for surgical intervention pending MRI results.   - C/w IV antibiotics   - Excisional debridement down to and including level of epidermis with #15 blade  - Local wound care with betadine DSD  - Reviewed x-rays & d/w pt  - Rest of care per primary team    Plan d/w Attending. Podiatry following.  47 y/o M with pmh HIV, DM, HTN, Partial R hallux amp(1/2022) admitted for R toe wound previously found to have OM & treated with 6 weeks IV abx.  XR with osteomyelitis of 1st proximal phalanx head. At time of consult, VSS, WBC 5.64, ESR 56, CRP 44.6 High suspicion for Osteomyelitis of R hallux.     Plan:   - Recc MRI to evaluate extent of OM & rule-out abscess. Possible plan for surgical intervention pending MRI results.   - C/w IV antibiotics   - Excisional debridement down to and including level of epidermis with #15 blade  - Local wound care with betadine DSD  - Reviewed x-rays & d/w pt  - Rest of care per primary team    Plan d/w Attending. Podiatry following.

## 2023-07-29 DIAGNOSIS — E11.649 TYPE 2 DIABETES MELLITUS WITH HYPOGLYCEMIA WITHOUT COMA: ICD-10-CM

## 2023-07-29 LAB
ALBUMIN SERPL ELPH-MCNC: 3.1 G/DL — LOW (ref 3.3–5)
ALP SERPL-CCNC: 74 U/L — SIGNIFICANT CHANGE UP (ref 40–120)
ALT FLD-CCNC: 7 U/L — LOW (ref 10–45)
ANION GAP SERPL CALC-SCNC: 3 MMOL/L — LOW (ref 5–17)
AST SERPL-CCNC: 11 U/L — SIGNIFICANT CHANGE UP (ref 10–40)
BASOPHILS # BLD AUTO: 0.03 K/UL — SIGNIFICANT CHANGE UP (ref 0–0.2)
BASOPHILS NFR BLD AUTO: 0.7 % — SIGNIFICANT CHANGE UP (ref 0–2)
BILIRUB SERPL-MCNC: 0.5 MG/DL — SIGNIFICANT CHANGE UP (ref 0.2–1.2)
BUN SERPL-MCNC: 14 MG/DL — SIGNIFICANT CHANGE UP (ref 7–23)
CALCIUM SERPL-MCNC: 8.6 MG/DL — SIGNIFICANT CHANGE UP (ref 8.4–10.5)
CHLORIDE SERPL-SCNC: 109 MMOL/L — HIGH (ref 96–108)
CO2 SERPL-SCNC: 26 MMOL/L — SIGNIFICANT CHANGE UP (ref 22–31)
CREAT SERPL-MCNC: 1.49 MG/DL — HIGH (ref 0.5–1.3)
CRP SERPL-MCNC: 20.5 MG/L — HIGH (ref 0–4)
EGFR: 57 ML/MIN/1.73M2 — LOW
EOSINOPHIL # BLD AUTO: 0.13 K/UL — SIGNIFICANT CHANGE UP (ref 0–0.5)
EOSINOPHIL NFR BLD AUTO: 3.2 % — SIGNIFICANT CHANGE UP (ref 0–6)
ERYTHROCYTE [SEDIMENTATION RATE] IN BLOOD: 66 MM/HR — HIGH
GLUCOSE BLDC GLUCOMTR-MCNC: 208 MG/DL — HIGH (ref 70–99)
GLUCOSE BLDC GLUCOMTR-MCNC: 227 MG/DL — HIGH (ref 70–99)
GLUCOSE BLDC GLUCOMTR-MCNC: 251 MG/DL — HIGH (ref 70–99)
GLUCOSE BLDC GLUCOMTR-MCNC: 79 MG/DL — SIGNIFICANT CHANGE UP (ref 70–99)
GLUCOSE BLDC GLUCOMTR-MCNC: 84 MG/DL — SIGNIFICANT CHANGE UP (ref 70–99)
GLUCOSE SERPL-MCNC: 64 MG/DL — LOW (ref 70–99)
HCT VFR BLD CALC: 33.9 % — LOW (ref 39–50)
HGB BLD-MCNC: 11.6 G/DL — LOW (ref 13–17)
IMM GRANULOCYTES NFR BLD AUTO: 0.2 % — SIGNIFICANT CHANGE UP (ref 0–0.9)
LYMPHOCYTES # BLD AUTO: 1.94 K/UL — SIGNIFICANT CHANGE UP (ref 1–3.3)
LYMPHOCYTES # BLD AUTO: 48.1 % — HIGH (ref 13–44)
MCHC RBC-ENTMCNC: 31.4 PG — SIGNIFICANT CHANGE UP (ref 27–34)
MCHC RBC-ENTMCNC: 34.2 GM/DL — SIGNIFICANT CHANGE UP (ref 32–36)
MCV RBC AUTO: 91.6 FL — SIGNIFICANT CHANGE UP (ref 80–100)
MONOCYTES # BLD AUTO: 0.34 K/UL — SIGNIFICANT CHANGE UP (ref 0–0.9)
MONOCYTES NFR BLD AUTO: 8.4 % — SIGNIFICANT CHANGE UP (ref 2–14)
NEUTROPHILS # BLD AUTO: 1.58 K/UL — LOW (ref 1.8–7.4)
NEUTROPHILS NFR BLD AUTO: 39.4 % — LOW (ref 43–77)
NRBC # BLD: 0 /100 WBCS — SIGNIFICANT CHANGE UP (ref 0–0)
PLATELET # BLD AUTO: 245 K/UL — SIGNIFICANT CHANGE UP (ref 150–400)
POTASSIUM SERPL-MCNC: 4.1 MMOL/L — SIGNIFICANT CHANGE UP (ref 3.5–5.3)
POTASSIUM SERPL-SCNC: 4.1 MMOL/L — SIGNIFICANT CHANGE UP (ref 3.5–5.3)
PROT SERPL-MCNC: 7.4 G/DL — SIGNIFICANT CHANGE UP (ref 6–8.3)
RBC # BLD: 3.7 M/UL — LOW (ref 4.2–5.8)
RBC # FLD: 11.9 % — SIGNIFICANT CHANGE UP (ref 10.3–14.5)
SODIUM SERPL-SCNC: 138 MMOL/L — SIGNIFICANT CHANGE UP (ref 135–145)
VANCOMYCIN TROUGH SERPL-MCNC: 16.7 UG/ML — SIGNIFICANT CHANGE UP (ref 10–20)
WBC # BLD: 4.03 K/UL — SIGNIFICANT CHANGE UP (ref 3.8–10.5)
WBC # FLD AUTO: 4.03 K/UL — SIGNIFICANT CHANGE UP (ref 3.8–10.5)

## 2023-07-29 PROCEDURE — 73719 MRI LOWER EXTREMITY W/DYE: CPT | Mod: 26,RT

## 2023-07-29 PROCEDURE — 99233 SBSQ HOSP IP/OBS HIGH 50: CPT | Mod: GC

## 2023-07-29 RX ORDER — VANCOMYCIN HCL 1 G
1500 VIAL (EA) INTRAVENOUS EVERY 12 HOURS
Refills: 0 | Status: COMPLETED | OUTPATIENT
Start: 2023-07-29 | End: 2023-07-30

## 2023-07-29 RX ORDER — VANCOMYCIN HCL 1 G
1750 VIAL (EA) INTRAVENOUS EVERY 12 HOURS
Refills: 0 | Status: COMPLETED | OUTPATIENT
Start: 2023-07-29 | End: 2023-07-29

## 2023-07-29 RX ORDER — INSULIN GLARGINE 100 [IU]/ML
40 INJECTION, SOLUTION SUBCUTANEOUS AT BEDTIME
Refills: 0 | Status: DISCONTINUED | OUTPATIENT
Start: 2023-07-29 | End: 2023-07-30

## 2023-07-29 RX ADMIN — PIPERACILLIN AND TAZOBACTAM 25 GRAM(S): 4; .5 INJECTION, POWDER, LYOPHILIZED, FOR SOLUTION INTRAVENOUS at 13:13

## 2023-07-29 RX ADMIN — PIPERACILLIN AND TAZOBACTAM 25 GRAM(S): 4; .5 INJECTION, POWDER, LYOPHILIZED, FOR SOLUTION INTRAVENOUS at 20:00

## 2023-07-29 RX ADMIN — INSULIN GLARGINE 40 UNIT(S): 100 INJECTION, SOLUTION SUBCUTANEOUS at 22:51

## 2023-07-29 RX ADMIN — Medication 6: at 22:41

## 2023-07-29 RX ADMIN — Medication 250 MILLIGRAM(S): at 13:13

## 2023-07-29 RX ADMIN — LOSARTAN POTASSIUM 25 MILLIGRAM(S): 100 TABLET, FILM COATED ORAL at 19:11

## 2023-07-29 RX ADMIN — BICTEGRAVIR SODIUM, EMTRICITABINE, AND TENOFOVIR ALAFENAMIDE FUMARATE 1 TABLET(S): 30; 120; 15 TABLET ORAL at 13:13

## 2023-07-29 RX ADMIN — Medication 4: at 17:30

## 2023-07-29 RX ADMIN — Medication 81 MILLIGRAM(S): at 13:12

## 2023-07-29 RX ADMIN — PIPERACILLIN AND TAZOBACTAM 25 GRAM(S): 4; .5 INJECTION, POWDER, LYOPHILIZED, FOR SOLUTION INTRAVENOUS at 05:49

## 2023-07-29 NOTE — PROGRESS NOTE ADULT - PROBLEM SELECTOR PLAN 2
- creatinine 1.66 on admission; downtrended overnight to 1.36  - intrinsic (vanc tox) vs. pre-renal origin due to hypotension in ED   - patient verbalized that on prior admission for OM, inpatient team was monitoring elevated CK levels while on vanc - creatinine 1.66 on admission; downtrending but increased from 1.36 to 1.49 overnight  - intrinsic (vanc tox) vs. pre-renal origin due to hypotension in ED   - patient verbalized that on prior admission for OM, inpatient team was monitoring elevated CK levels while on vanc  - follow up baseline Cr from [facility] - creatinine 1.66 on admission; downtrending overall but increased from 1.36 to 1.49 overnight  - intrinsic (vanc tox) vs. pre-renal origin due to hypotension in ED   - patient verbalized that on prior admission for OM, inpatient team was monitoring elevated CK levels while on vanc  - follow up baseline Cr from [facility]

## 2023-07-29 NOTE — PROGRESS NOTE ADULT - ASSESSMENT
48M with pmhx of HIV (compliant with Biktarvy), partial great toe amputation in Jan 2022, recent R great toe OM in May (s/p 6 week course of Cefepime and Vancomycin), DM, HTN who presents for a 1 day history of right foot pain and swelling admitted for R Hallux osteomyelitis.

## 2023-07-29 NOTE — PROGRESS NOTE ADULT - SUBJECTIVE AND OBJECTIVE BOX
Patient is a 48y old  Male who presents with a chief complaint of right foot pain (28 Jul 2023 07:37)      INTERVAL HPI/ OVERNIGHT EVENTS: no acute events overnight. Dressings noted to be dry intact and clean to the R foot.       LABS                        11.6   4.03  )-----------( 245      ( 29 Jul 2023 07:39 )             33.9     07-29    138  |  109<H>  |  14  ----------------------------<  64<L>  4.1   |  26  |  1.49<H>    Ca    8.6      29 Jul 2023 05:30  Phos  2.9     07-28  Mg     2.0     07-28    TPro  7.4  /  Alb  3.1<L>  /  TBili  0.5  /  DBili  x   /  AST  11  /  ALT  7<L>  /  AlkPhos  74  07-29      ESR: 66  CRP: --  07-29 @ 07:39    ICU Vital Signs Last 24 Hrs  T(C): 36.6 (29 Jul 2023 09:40), Max: 37.2 (28 Jul 2023 15:15)  T(F): 97.9 (29 Jul 2023 09:40), Max: 99 (28 Jul 2023 15:15)  HR: 83 (29 Jul 2023 09:40) (65 - 89)  BP: 148/95 (29 Jul 2023 09:40) (138/93 - 160/94)  BP(mean): --  ABP: --  ABP(mean): --  RR: 18 (29 Jul 2023 09:40) (18 - 18)  SpO2: 97% (29 Jul 2023 09:40) (97% - 99%)    O2 Parameters below as of 29 Jul 2023 09:40  Patient On (Oxygen Delivery Method): room air            PHYSICAL EXAM  General: NAD, AA0x3    Lower Extremity Focused:  Vasc: DP/PT 2/4 b/l, edema to R dorsal foot and R hallux, increased warmth to R dorsal foot  Derm: sub R hallux wound 2.5 x 1.7 x 0.5 cm granular wound with amcerated border, serous drainage, positive PTB, no tracking or tunneling; no open wounds left  Neuro: protective sensation diminished b/l  MSK: Partial hallux amp R    RADIOLOGY    < from: MR Foot w/ IV Cont, Right (07.29.23 @ 09:52) >    IMPRESSION:    1.  Plantar skin wound with soft tissue tract extending to the proximal   phalanx at the great toe. Rim-enhancing fluid within the tract concerning   for infection.  2.  Diffuse soft tissue swelling as can be seen with cellulitis.  3.  Abnormal marrow signal and erosive change involving the first   proximal phalanx. Given the overlying skin wound, findings areconcerning   for osteomyelitis.  4.  First metatarsophalangeal joint effusion, nonspecific finding that   can be seen with infection in the appropriate clinical setting. If   further evaluation is warranted, aspiration can be performed.    < end of copied text >      < from: Xray Foot AP + Lateral + Oblique, Right (07.27.23 @ 14:04) >  IMPRESSION:  1.  Erosive changes of the 1st proximal phalanx head concerning for   osteomyelitis.    < end of copied text >

## 2023-07-29 NOTE — PROGRESS NOTE ADULT - PROBLEM SELECTOR PLAN 3
pt reports compliance with Biktarvy, continue while inpatient  last VL in April 2023 was undetectable    -obtain admission notes from May hospitalization to confirm CD4 count

## 2023-07-29 NOTE — PROGRESS NOTE ADULT - PROBLEM SELECTOR PLAN 7
Pt reports hx of diabetes, complicated with diabetic neuropathy  -uses trulicity 1.5 and Basaglar 70u at home  - A1C 7.2%  -on Lantus dosing at 50u and increase as needed  -mISS Pt reports hx of diabetes, complicated with diabetic neuropathy  Uses trulicity 1.5 and Basaglar 70u at home  HbA1C 7.2%, Estimated average glucose 160  Glucose significantly decreased this morning to 64, from 190s-200s prior days. Patient reports eating well, not skipping meals.   -Decrease Lantus dosing to 40u  -mISS

## 2023-07-29 NOTE — PROGRESS NOTE ADULT - PROBLEM SELECTOR PLAN 1
Pt presenting with a 1 day history of swelling and pain to the R toe. Reports hx of OM of R great toe (has 3 month history of open wound to the toe) and completed 6 week course of antibiotics mid June (Vanc/Cefepime). Followed with a podiatrist, who reported improvement per x-ray, however 2 weeks ago again started to have purulence/swelling and was given 5 day course of Bactrim. s/p Vanc and Zosyn in the ED - XR concerning for OM. MRI 7/29 revealed Plantar skin wound with soft tissue tract extending to the proximal phalanx at the great toe and Rim-enhancing fluid within the tract concerning for infection.     -did not meet SIRS criteria on admission  -podiatry consulted, follow up recs regarding Surgery per MRI findings, continue antibiotics  -c/w Vanc and Zosyn (pseudomonas dosing given hx of DM)  - contact [facility] for notes from May 2023 admission to gather culture data and ESR/CRP trends. Pt presenting with a 1 day history of swelling and pain to the R toe. Reports hx of OM of R great toe (has 3 month history of open wound to the toe) and completed 6 week course of antibiotics mid June (Vanc/Cefepime). Followed with a podiatrist, who reported improvement per x-ray, however 2 weeks ago again started to have purulence/swelling and was given 5 day course of Bactrim. s/p Vanc and Zosyn in the ED - XR concerning for OM. MRI 7/29 revealed Plantar skin wound with soft tissue tract extending to the proximal phalanx at the great toe and Rim-enhancing fluid within the tract concerning for infection.     -did not meet SIRS criteria on admission  -podiatry consulted, follow up recs regarding need for Surgery per MRI findings. Continue antibiotics  -c/w Vanc and Zosyn (pseudomonas dosing given hx of DM)  - follow up [facility] for notes from May 2023 admission to gather culture data and ESR/CRP trends.

## 2023-07-29 NOTE — PROGRESS NOTE ADULT - ASSESSMENT
49 y/o M with pmh HIV, DM, HTN, Partial R hallux amp(1/2022) admitted for R toe wound previously found to have OM & treated with 6 weeks IV abx.  XR with osteomyelitis of 1st proximal phalanx head. At time of consult, VSS, WBC 5.64, ESR 56, CRP 44.6. MRI with findings consistent of OM of the 1st ray proximal phalanx. High suspicion for Osteomyelitis of R hallux.     Plan:    **INCOMPLETE**   - C/w IV antibiotics   - MRI findings discussed with pt, prefers non-surgical tx with IV abx.   - Local wound care: Cleansed with NS. Applied betadine soaked gauze, kerlix with tape.   - Reviewed x-rays & d/w pt  - Rest of care per primary team    Plan d/w Attending. Podiatry following.  49 y/o M with pmh HIV, DM, HTN, Partial R hallux amp(1/2022) admitted for R toe wound previously found to have OM & treated with 6 weeks IV abx.  XR with osteomyelitis of 1st proximal phalanx head. At time of consult, VSS, WBC 5.64, ESR 56, CRP 44.6. MRI with findings consistent of OM of the 1st ray proximal phalanx. High suspicion for Osteomyelitis of R hallux.     Plan:     - C/w IV antibiotics   - MRI findings discussed with pt. Pt refusing surgical intervention. Expresses wishes that the wound has been healing and that the bone is not infected as his prior doctors have told him. Willing to go with 6 wks of IV abx.   - Local wound care: Cleansed with NS. Applied betadine soaked gauze, kerlix with tape.   - Reviewed x-rays & d/w pt  - Rest of care per primary team    Plan d/w Attending. Podiatry following.

## 2023-07-29 NOTE — PROGRESS NOTE ADULT - SUBJECTIVE AND OBJECTIVE BOX
OVERNIGHT EVENTS: No acute events overnight.     SUBJECTIVE / INTERVAL HPI: Patient seen and examined at bedside - no complaints. He denies any foot pain, shortness of breath, abdominal pain.    VITAL SIGNS:  Vital Signs Last 24 Hrs  T(C): 36.6 (29 Jul 2023 09:40), Max: 37.2 (28 Jul 2023 15:15)  T(F): 97.9 (29 Jul 2023 09:40), Max: 99 (28 Jul 2023 15:15)  HR: 83 (29 Jul 2023 09:40) (65 - 89)  BP: 148/95 (29 Jul 2023 09:40) (138/93 - 160/94)  BP(mean): --  ABP: --  ABP(mean): --  RR: 18 (29 Jul 2023 09:40) (18 - 18)  SpO2: 97% (29 Jul 2023 09:40) (97% - 99%)    O2 Parameters below as of 29 Jul 2023 09:40  Patient On (Oxygen Delivery Method): room air            PHYSICAL EXAM:    General: NAD  HEENT: NC/AT; anicteric sclera; MMM  Neck: supple w/o palpable nodularity  Cardiovascular: +S1/S2; RRR  Respiratory: CTA B/L; no W/R/R  Gastrointestinal: soft, NT/ND; +BS  Extremities: R distal great toe s/p amputation. R LE wrapped, unable to visualize ulcer. R foot warm, mild edema, no erythema. L lower extremity unremarkable.  Vascular: 2+ radial, 1+ DP/PT pulses B/L  Neurological: AAOx3; no focal deficits    MEDICATIONS:  MEDICATIONS  (STANDING):  aspirin  chewable 81 milliGRAM(s) Oral every 24 hours  bictegravir 50 mG/emtricitabine 200 mG/tenofovir alafenamide 25 mG (BIKTARVY) 1 Tablet(s) Oral every 24 hours  dextrose 5%. 1000 milliLiter(s) (50 mL/Hr) IV Continuous <Continuous>  dextrose 5%. 1000 milliLiter(s) (100 mL/Hr) IV Continuous <Continuous>  dextrose 50% Injectable 25 Gram(s) IV Push once  dextrose 50% Injectable 12.5 Gram(s) IV Push once  dextrose 50% Injectable 25 Gram(s) IV Push once  glucagon  Injectable 1 milliGRAM(s) IntraMuscular once  heparin   Injectable 5000 Unit(s) SubCutaneous every 8 hours  insulin glargine Injectable (LANTUS) 50 Unit(s) SubCutaneous at bedtime  insulin lispro (ADMELOG) corrective regimen sliding scale   SubCutaneous Before meals and at bedtime  losartan 25 milliGRAM(s) Oral every 24 hours  piperacillin/tazobactam IVPB.. 4.5 Gram(s) IV Intermittent every 8 hours  vancomycin  IVPB 1750 milliGRAM(s) IV Intermittent every 12 hours    MEDICATIONS  (PRN):  acetaminophen     Tablet .. 650 milliGRAM(s) Oral every 6 hours PRN Mild Pain (1 - 3)  dextrose Oral Gel 15 Gram(s) Oral once PRN Blood Glucose LESS THAN 70 milliGRAM(s)/deciliter      ALLERGIES:  Allergies    shellfish (Short breath)  doxycycline (Diarrhea)    Intolerances        LABS:                               11.6   4.03  )-----------( 245      ( 29 Jul 2023 07:39 )             33.9     07-29    138  |  109<H>  |  14  ----------------------------<  64<L>  4.1   |  26  |  1.49<H>    Ca    8.6      29 Jul 2023 05:30  Phos  2.9     07-28  Mg     2.0     07-28    TPro  7.4  /  Alb  3.1<L>  /  TBili  0.5  /  DBili  x   /  AST  11  /  ALT  7<L>  /  AlkPhos  74  07-29      CAPILLARY BLOOD GLUCOSE      POCT Blood Glucose.: 79 mg/dL (29 Jul 2023 09:36)  POCT Blood Glucose.: 168 mg/dL (28 Jul 2023 21:50)  POCT Blood Glucose.: 173 mg/dL (28 Jul 2023 18:31)  POCT Blood Glucose.: 221 mg/dL (28 Jul 2023 17:25)  POCT Blood Glucose.: 141 mg/dL (28 Jul 2023 13:38)      RADIOLOGY & ADDITIONAL TESTS:   MR FOOT WITH IV CONTRAST RIGHT dated 7/29/2023 9:52 AM    INDICATION: Pain and swelling at the big toe    IMPRESSION:    1.  Plantar skin wound with soft tissue tract extending to the proximal   phalanx at the great toe. Rim-enhancing fluid within the tract concerning   for infection.  2.  Diffuse soft tissue swelling as can be seen with cellulitis.  3.  Abnormal marrow signal and erosive change involving the first   proximal phalanx. Given the overlying skin wound, findings areconcerning   for osteomyelitis.  4.  First metatarsophalangeal joint effusion, nonspecific finding that   can be seen with infection in the appropriate clinical setting. If   further evaluation is warranted, aspiration can be performed.

## 2023-07-30 LAB
ANION GAP SERPL CALC-SCNC: 8 MMOL/L — SIGNIFICANT CHANGE UP (ref 5–17)
BASOPHILS # BLD AUTO: 0.06 K/UL — SIGNIFICANT CHANGE UP (ref 0–0.2)
BASOPHILS NFR BLD AUTO: 1.4 % — SIGNIFICANT CHANGE UP (ref 0–2)
BUN SERPL-MCNC: 13 MG/DL — SIGNIFICANT CHANGE UP (ref 7–23)
CALCIUM SERPL-MCNC: 8.5 MG/DL — SIGNIFICANT CHANGE UP (ref 8.4–10.5)
CHLORIDE SERPL-SCNC: 104 MMOL/L — SIGNIFICANT CHANGE UP (ref 96–108)
CO2 SERPL-SCNC: 25 MMOL/L — SIGNIFICANT CHANGE UP (ref 22–31)
CREAT SERPL-MCNC: 1.51 MG/DL — HIGH (ref 0.5–1.3)
EGFR: 56 ML/MIN/1.73M2 — LOW
EOSINOPHIL # BLD AUTO: 0.12 K/UL — SIGNIFICANT CHANGE UP (ref 0–0.5)
EOSINOPHIL NFR BLD AUTO: 2.9 % — SIGNIFICANT CHANGE UP (ref 0–6)
GLUCOSE BLDC GLUCOMTR-MCNC: 120 MG/DL — HIGH (ref 70–99)
GLUCOSE BLDC GLUCOMTR-MCNC: 175 MG/DL — HIGH (ref 70–99)
GLUCOSE BLDC GLUCOMTR-MCNC: 181 MG/DL — HIGH (ref 70–99)
GLUCOSE BLDC GLUCOMTR-MCNC: 73 MG/DL — SIGNIFICANT CHANGE UP (ref 70–99)
GLUCOSE SERPL-MCNC: 72 MG/DL — SIGNIFICANT CHANGE UP (ref 70–99)
GRAM STN FLD: SIGNIFICANT CHANGE UP
HCT VFR BLD CALC: 36.1 % — LOW (ref 39–50)
HGB BLD-MCNC: 12.6 G/DL — LOW (ref 13–17)
IMM GRANULOCYTES NFR BLD AUTO: 0.2 % — SIGNIFICANT CHANGE UP (ref 0–0.9)
LYMPHOCYTES # BLD AUTO: 1.88 K/UL — SIGNIFICANT CHANGE UP (ref 1–3.3)
LYMPHOCYTES # BLD AUTO: 45 % — HIGH (ref 13–44)
MAGNESIUM SERPL-MCNC: 1.9 MG/DL — SIGNIFICANT CHANGE UP (ref 1.6–2.6)
MCHC RBC-ENTMCNC: 31.8 PG — SIGNIFICANT CHANGE UP (ref 27–34)
MCHC RBC-ENTMCNC: 34.9 GM/DL — SIGNIFICANT CHANGE UP (ref 32–36)
MCV RBC AUTO: 91.2 FL — SIGNIFICANT CHANGE UP (ref 80–100)
MONOCYTES # BLD AUTO: 0.39 K/UL — SIGNIFICANT CHANGE UP (ref 0–0.9)
MONOCYTES NFR BLD AUTO: 9.3 % — SIGNIFICANT CHANGE UP (ref 2–14)
NEUTROPHILS # BLD AUTO: 1.72 K/UL — LOW (ref 1.8–7.4)
NEUTROPHILS NFR BLD AUTO: 41.2 % — LOW (ref 43–77)
NRBC # BLD: 0 /100 WBCS — SIGNIFICANT CHANGE UP (ref 0–0)
PHOSPHATE SERPL-MCNC: 3.7 MG/DL — SIGNIFICANT CHANGE UP (ref 2.5–4.5)
PLATELET # BLD AUTO: 256 K/UL — SIGNIFICANT CHANGE UP (ref 150–400)
POTASSIUM SERPL-MCNC: 4.2 MMOL/L — SIGNIFICANT CHANGE UP (ref 3.5–5.3)
POTASSIUM SERPL-SCNC: 4.2 MMOL/L — SIGNIFICANT CHANGE UP (ref 3.5–5.3)
RBC # BLD: 3.96 M/UL — LOW (ref 4.2–5.8)
RBC # FLD: 11.5 % — SIGNIFICANT CHANGE UP (ref 10.3–14.5)
SODIUM SERPL-SCNC: 137 MMOL/L — SIGNIFICANT CHANGE UP (ref 135–145)
SPECIMEN SOURCE: SIGNIFICANT CHANGE UP
WBC # BLD: 4.18 K/UL — SIGNIFICANT CHANGE UP (ref 3.8–10.5)
WBC # FLD AUTO: 4.18 K/UL — SIGNIFICANT CHANGE UP (ref 3.8–10.5)

## 2023-07-30 PROCEDURE — 99232 SBSQ HOSP IP/OBS MODERATE 35: CPT | Mod: GC

## 2023-07-30 RX ORDER — INSULIN LISPRO 100/ML
2 VIAL (ML) SUBCUTANEOUS
Refills: 0 | Status: DISCONTINUED | OUTPATIENT
Start: 2023-07-30 | End: 2023-08-02

## 2023-07-30 RX ORDER — INSULIN GLARGINE 100 [IU]/ML
35 INJECTION, SOLUTION SUBCUTANEOUS AT BEDTIME
Refills: 0 | Status: DISCONTINUED | OUTPATIENT
Start: 2023-07-30 | End: 2023-08-02

## 2023-07-30 RX ORDER — POLYETHYLENE GLYCOL 3350 17 G/17G
17 POWDER, FOR SOLUTION ORAL
Refills: 0 | Status: DISCONTINUED | OUTPATIENT
Start: 2023-07-30 | End: 2023-08-02

## 2023-07-30 RX ADMIN — PIPERACILLIN AND TAZOBACTAM 25 GRAM(S): 4; .5 INJECTION, POWDER, LYOPHILIZED, FOR SOLUTION INTRAVENOUS at 12:20

## 2023-07-30 RX ADMIN — BICTEGRAVIR SODIUM, EMTRICITABINE, AND TENOFOVIR ALAFENAMIDE FUMARATE 1 TABLET(S): 30; 120; 15 TABLET ORAL at 10:12

## 2023-07-30 RX ADMIN — LOSARTAN POTASSIUM 25 MILLIGRAM(S): 100 TABLET, FILM COATED ORAL at 19:17

## 2023-07-30 RX ADMIN — INSULIN GLARGINE 35 UNIT(S): 100 INJECTION, SOLUTION SUBCUTANEOUS at 22:37

## 2023-07-30 RX ADMIN — Medication 300 MILLIGRAM(S): at 00:05

## 2023-07-30 RX ADMIN — PIPERACILLIN AND TAZOBACTAM 25 GRAM(S): 4; .5 INJECTION, POWDER, LYOPHILIZED, FOR SOLUTION INTRAVENOUS at 19:17

## 2023-07-30 RX ADMIN — PIPERACILLIN AND TAZOBACTAM 25 GRAM(S): 4; .5 INJECTION, POWDER, LYOPHILIZED, FOR SOLUTION INTRAVENOUS at 04:17

## 2023-07-30 RX ADMIN — Medication 2 UNIT(S): at 18:37

## 2023-07-30 RX ADMIN — Medication 300 MILLIGRAM(S): at 10:12

## 2023-07-30 RX ADMIN — Medication 2: at 18:37

## 2023-07-30 RX ADMIN — Medication 81 MILLIGRAM(S): at 12:21

## 2023-07-30 RX ADMIN — Medication 300 MILLIGRAM(S): at 23:37

## 2023-07-30 NOTE — PROGRESS NOTE ADULT - PROBLEM SELECTOR PLAN 3
pt reports compliance with Biktarvy, continue while inpatient  last VL in April 2023 was undetectable    -obtain admission notes from May hospitalization to confirm CD4 count - creatinine 1.66 on admission; downtrending overall but increased from 1.36 to 1.49 overnight  - intrinsic (vanc tox) vs. pre-renal origin due to hypotension in ED   - patient verbalized that on prior admission for OM, inpatient team was monitoring elevated CK levels while on vanc  - follow up baseline Cr from [facility]

## 2023-07-30 NOTE — PROGRESS NOTE ADULT - ASSESSMENT
49 y/o M with pmh HIV, DM, HTN, Partial R hallux amp(1/2022) admitted for R toe wound previously found to have OM & treated with 6 weeks IV abx.  XR with osteomyelitis of 1st proximal phalanx head. At time of consult, VSS, WBC 5.64, ESR 56, CRP 44.6. MRI with findings consistent of OM of the 1st ray proximal phalanx. High suspicion for Osteomyelitis of R hallux.     Plan:     - C/w IV antibiotics   - MRI findings discussed with pt. Pt refusing surgical intervention. Expresses wishes that the wound has been healing and that the bone is not infected as his prior doctors have told him. Willing to go with 6 wks of IV abx.   - F/u ID recs  - Local wound care: Cleansed with NS. Applied betadine soaked gauze, kerlix with tape.   - Deep wound cx obtained, f/u wound cx  - Rest of care per primary team    Plan d/w Attending. Podiatry following.

## 2023-07-30 NOTE — PROGRESS NOTE ADULT - PROBLEM SELECTOR PLAN 7
Pt reports hx of diabetes, complicated with diabetic neuropathy  Uses trulicity 1.5 and Basaglar 70u at home  HbA1C 7.2%, Estimated average glucose 160  Glucose significantly decreased this morning to 64, from 190s-200s prior days. Patient reports eating well, not skipping meals.   -Decrease Lantus dosing to 40u  -mISS F: s/p NS in the ED  E: replete K>4 Mg>2  N: consistent carb  D: heparin subq  Dispo: [facility]

## 2023-07-30 NOTE — PROGRESS NOTE ADULT - SUBJECTIVE AND OBJECTIVE BOX
Patient is a 48y old  Male who presents with a chief complaint of right hallux osteomyelitis (29 Jul 2023 09:09)      INTERVAL HPI/ OVERNIGHT EVENTS: no acute events overnight. Dressings noted to be dry intact and clean to the R foot.       LABS                        12.6   4.18  )-----------( 256      ( 30 Jul 2023 07:06 )             36.1     07-30    137  |  104  |  13  ----------------------------<  72  4.2   |  25  |  1.51<H>    Ca    8.5      30 Jul 2023 07:06  Phos  3.7     07-30  Mg     1.9     07-30    TPro  7.4  /  Alb  3.1<L>  /  TBili  0.5  /  DBili  x   /  AST  11  /  ALT  7<L>  /  AlkPhos  74  07-29        ICU Vital Signs Last 24 Hrs  T(C): 36.4 (30 Jul 2023 05:39), Max: 36.6 (29 Jul 2023 09:40)  T(F): 97.6 (30 Jul 2023 05:39), Max: 97.9 (29 Jul 2023 09:40)  HR: 78 (30 Jul 2023 05:39) (75 - 83)  BP: 156/93 (30 Jul 2023 05:39) (148/95 - 163/100)  BP(mean): --  ABP: --  ABP(mean): --  RR: 18 (30 Jul 2023 05:39) (18 - 18)  SpO2: 99% (30 Jul 2023 05:39) (97% - 99%)    O2 Parameters below as of 29 Jul 2023 22:00  Patient On (Oxygen Delivery Method): room air            PHYSICAL EXAM  General: NAD, AA0x3    Lower Extremity Focused:  Vasc: DP/PT 2/4 b/l, edema to R dorsal foot and R hallux, increased warmth to R dorsal foot  Derm: sub R hallux wound 2.5 x 1.7 x 0.5 cm granular wound with amcerated border, serous drainage, positive PTB, no tracking or tunneling; no open wounds left  Neuro: protective sensation diminished b/l  MSK: Partial hallux amp R    RADIOLOGY    < from: MR Foot w/ IV Cont, Right (07.29.23 @ 09:52) >    IMPRESSION:    1.  Plantar skin wound with soft tissue tract extending to the proximal   phalanx at the great toe. Rim-enhancing fluid within the tract concerning   for infection.  2.  Diffuse soft tissue swelling as can be seen with cellulitis.  3.  Abnormal marrow signal and erosive change involving the first   proximal phalanx. Given the overlying skin wound, findings areconcerning   for osteomyelitis.  4.  First metatarsophalangeal joint effusion, nonspecific finding that   can be seen with infection in the appropriate clinical setting. If   further evaluation is warranted, aspiration can be performed.    < end of copied text >      < from: Xray Foot AP + Lateral + Oblique, Right (07.27.23 @ 14:04) >  IMPRESSION:  1.  Erosive changes of the 1st proximal phalanx head concerning for   osteomyelitis.    < end of copied text >

## 2023-07-30 NOTE — PROGRESS NOTE ADULT - PROBLEM SELECTOR PLAN 1
Pt presenting with a 1 day history of swelling and pain to the R toe. Reports hx of OM of R great toe (has 3 month history of open wound to the toe) and completed 6 week course of antibiotics mid June (Vanc/Cefepime). Followed with a podiatrist, who reported improvement per x-ray, however 2 weeks ago again started to have purulence/swelling and was given 5 day course of Bactrim. s/p Vanc and Zosyn in the ED - XR concerning for OM. MRI 7/29 revealed Plantar skin wound with soft tissue tract extending to the proximal phalanx at the great toe and Rim-enhancing fluid within the tract concerning for infection.     -did not meet SIRS criteria on admission  -podiatry consulted, follow up recs regarding need for Surgery per MRI findings. Continue antibiotics  -c/w Vanc and Zosyn (pseudomonas dosing given hx of DM)  - follow up [facility] for notes from May 2023 admission to gather culture data and ESR/CRP trends.

## 2023-07-30 NOTE — PROGRESS NOTE ADULT - PROBLEM SELECTOR PLAN 4
uses Losartan 25mg PO qD, continue while inpatient pt reports compliance with Biktarvy, continue while inpatient  last VL in April 2023 was undetectable    -obtain admission notes from May hospitalization to confirm CD4 count

## 2023-07-30 NOTE — PROGRESS NOTE ADULT - PROBLEM SELECTOR PLAN 2
- creatinine 1.66 on admission; downtrending overall but increased from 1.36 to 1.49 overnight  - intrinsic (vanc tox) vs. pre-renal origin due to hypotension in ED   - patient verbalized that on prior admission for OM, inpatient team was monitoring elevated CK levels while on vanc  - follow up baseline Cr from [facility] Pt reports hx of diabetes, complicated with diabetic neuropathy  Uses trulicity 1.5 and Basaglar 70u at home  HbA1C 7.2%, Estimated average glucose 160  Glucose continues to be low in AM. Patient reports eating well, not skipping meals.   -Decrease Lantus dosing to 35u, will likely start premeal insulin today  -mISS

## 2023-07-30 NOTE — PROGRESS NOTE ADULT - SUBJECTIVE AND OBJECTIVE BOX
OVERNIGHT EVENTS: No acute events overnight.     SUBJECTIVE / INTERVAL HPI: Patient seen and examined at bedside - no complaints. He denies any foot pain, shortness of breath, abdominal pain. Denies SOB, CP. ROS is otherwise negative.       Vital Signs Last 24 Hrs  T(C): 36.4 (30 Jul 2023 05:39), Max: 36.4 (30 Jul 2023 05:39)  T(F): 97.6 (30 Jul 2023 05:39), Max: 97.6 (30 Jul 2023 05:39)  HR: 78 (30 Jul 2023 05:39) (75 - 83)  BP: 156/93 (30 Jul 2023 05:39) (156/93 - 163/100)  BP(mean): --  RR: 18 (30 Jul 2023 05:39) (18 - 18)  SpO2: 99% (30 Jul 2023 05:39) (98% - 99%)    Parameters below as of 29 Jul 2023 22:00  Patient On (Oxygen Delivery Method): room air                PHYSICAL EXAM:    General: NAD  HEENT: NC/AT; anicteric sclera; MMM  Neck: supple w/o palpable nodularity  Cardiovascular: +S1/S2; RRR  Respiratory: CTA B/L; no W/R/R  Gastrointestinal: soft, NT/ND; +BS  Extremities: R distal great toe s/p amputation. R LE wrapped, unable to visualize ulcer. R foot warm, mild edema, no erythema. L lower extremity unremarkable.  Vascular: 2+ radial, 1+ DP/PT pulses B/L  Neurological: AAOx3; no focal deficits    MEDICATIONS:  MEDICATIONS  (STANDING):  aspirin  chewable 81 milliGRAM(s) Oral every 24 hours  bictegravir 50 mG/emtricitabine 200 mG/tenofovir alafenamide 25 mG (BIKTARVY) 1 Tablet(s) Oral every 24 hours  dextrose 5%. 1000 milliLiter(s) (100 mL/Hr) IV Continuous <Continuous>  dextrose 5%. 1000 milliLiter(s) (50 mL/Hr) IV Continuous <Continuous>  dextrose 50% Injectable 25 Gram(s) IV Push once  dextrose 50% Injectable 12.5 Gram(s) IV Push once  dextrose 50% Injectable 25 Gram(s) IV Push once  glucagon  Injectable 1 milliGRAM(s) IntraMuscular once  heparin   Injectable 5000 Unit(s) SubCutaneous every 8 hours  insulin glargine Injectable (LANTUS) 40 Unit(s) SubCutaneous at bedtime  insulin lispro (ADMELOG) corrective regimen sliding scale   SubCutaneous Before meals and at bedtime  losartan 25 milliGRAM(s) Oral every 24 hours  piperacillin/tazobactam IVPB.. 4.5 Gram(s) IV Intermittent every 8 hours  vancomycin  IVPB 1500 milliGRAM(s) IV Intermittent every 12 hours    MEDICATIONS  (PRN):  acetaminophen     Tablet .. 650 milliGRAM(s) Oral every 6 hours PRN Mild Pain (1 - 3)  dextrose Oral Gel 15 Gram(s) Oral once PRN Blood Glucose LESS THAN 70 milliGRAM(s)/deciliter      ALLERGIES:  Allergies    shellfish (Short breath)  doxycycline (Diarrhea)    Intolerances        LABS:                          12.6   4.18  )-----------( 256      ( 30 Jul 2023 07:06 )             36.1   07-30    137  |  104  |  13  ----------------------------<  72  4.2   |  25  |  1.51<H>    Ca    8.5      30 Jul 2023 07:06  Phos  3.7     07-30  Mg     1.9     07-30    TPro  7.4  /  Alb  3.1<L>  /  TBili  0.5  /  DBili  x   /  AST  11  /  ALT  7<L>  /  AlkPhos  74  07-29        CAPILLARY BLOOD GLUCOSE      POCT Blood Glucose.: 79 mg/dL (29 Jul 2023 09:36)  POCT Blood Glucose.: 168 mg/dL (28 Jul 2023 21:50)  POCT Blood Glucose.: 173 mg/dL (28 Jul 2023 18:31)  POCT Blood Glucose.: 221 mg/dL (28 Jul 2023 17:25)  POCT Blood Glucose.: 141 mg/dL (28 Jul 2023 13:38)      RADIOLOGY & ADDITIONAL TESTS:   MR FOOT WITH IV CONTRAST RIGHT dated 7/29/2023 9:52 AM    INDICATION: Pain and swelling at the big toe    IMPRESSION:    1.  Plantar skin wound with soft tissue tract extending to the proximal   phalanx at the great toe. Rim-enhancing fluid within the tract concerning   for infection.  2.  Diffuse soft tissue swelling as can be seen with cellulitis.  3.  Abnormal marrow signal and erosive change involving the first   proximal phalanx. Given the overlying skin wound, findings areconcerning   for osteomyelitis.  4.  First metatarsophalangeal joint effusion, nonspecific finding that   can be seen with infection in the appropriate clinical setting. If   further evaluation is warranted, aspiration can be performed.

## 2023-07-31 LAB
ALBUMIN SERPL ELPH-MCNC: 3.4 G/DL — SIGNIFICANT CHANGE UP (ref 3.3–5)
ALP SERPL-CCNC: 76 U/L — SIGNIFICANT CHANGE UP (ref 40–120)
ALT FLD-CCNC: 10 U/L — SIGNIFICANT CHANGE UP (ref 10–45)
ANION GAP SERPL CALC-SCNC: 11 MMOL/L — SIGNIFICANT CHANGE UP (ref 5–17)
ANION GAP SERPL CALC-SCNC: 9 MMOL/L — SIGNIFICANT CHANGE UP (ref 5–17)
APPEARANCE UR: CLEAR — SIGNIFICANT CHANGE UP
AST SERPL-CCNC: 15 U/L — SIGNIFICANT CHANGE UP (ref 10–40)
BACTERIA # UR AUTO: PRESENT /HPF
BASOPHILS # BLD AUTO: 0.05 K/UL — SIGNIFICANT CHANGE UP (ref 0–0.2)
BASOPHILS NFR BLD AUTO: 0.9 % — SIGNIFICANT CHANGE UP (ref 0–2)
BILIRUB SERPL-MCNC: 0.4 MG/DL — SIGNIFICANT CHANGE UP (ref 0.2–1.2)
BILIRUB UR-MCNC: NEGATIVE — SIGNIFICANT CHANGE UP
BUN SERPL-MCNC: 20 MG/DL — SIGNIFICANT CHANGE UP (ref 7–23)
BUN SERPL-MCNC: 20 MG/DL — SIGNIFICANT CHANGE UP (ref 7–23)
CALCIUM SERPL-MCNC: 8.6 MG/DL — SIGNIFICANT CHANGE UP (ref 8.4–10.5)
CALCIUM SERPL-MCNC: 8.7 MG/DL — SIGNIFICANT CHANGE UP (ref 8.4–10.5)
CHLORIDE SERPL-SCNC: 102 MMOL/L — SIGNIFICANT CHANGE UP (ref 96–108)
CHLORIDE SERPL-SCNC: 104 MMOL/L — SIGNIFICANT CHANGE UP (ref 96–108)
CK SERPL-CCNC: 149 U/L — SIGNIFICANT CHANGE UP (ref 30–200)
CO2 SERPL-SCNC: 24 MMOL/L — SIGNIFICANT CHANGE UP (ref 22–31)
CO2 SERPL-SCNC: 25 MMOL/L — SIGNIFICANT CHANGE UP (ref 22–31)
COLOR SPEC: YELLOW — SIGNIFICANT CHANGE UP
CREAT ?TM UR-MCNC: 99 MG/DL — SIGNIFICANT CHANGE UP
CREAT SERPL-MCNC: 2.25 MG/DL — HIGH (ref 0.5–1.3)
CREAT SERPL-MCNC: 2.26 MG/DL — HIGH (ref 0.5–1.3)
DIFF PNL FLD: ABNORMAL
EGFR: 35 ML/MIN/1.73M2 — LOW
EGFR: 35 ML/MIN/1.73M2 — LOW
EOSINOPHIL # BLD AUTO: 0.1 K/UL — SIGNIFICANT CHANGE UP (ref 0–0.5)
EOSINOPHIL NFR BLD AUTO: 1.8 % — SIGNIFICANT CHANGE UP (ref 0–6)
EPI CELLS # UR: SIGNIFICANT CHANGE UP /HPF (ref 0–5)
GLUCOSE BLDC GLUCOMTR-MCNC: 110 MG/DL — HIGH (ref 70–99)
GLUCOSE BLDC GLUCOMTR-MCNC: 131 MG/DL — HIGH (ref 70–99)
GLUCOSE BLDC GLUCOMTR-MCNC: 162 MG/DL — HIGH (ref 70–99)
GLUCOSE BLDC GLUCOMTR-MCNC: 230 MG/DL — HIGH (ref 70–99)
GLUCOSE SERPL-MCNC: 123 MG/DL — HIGH (ref 70–99)
GLUCOSE SERPL-MCNC: 139 MG/DL — HIGH (ref 70–99)
GLUCOSE UR QL: NEGATIVE — SIGNIFICANT CHANGE UP
GRAM STN FLD: SIGNIFICANT CHANGE UP
HCT VFR BLD CALC: 36.3 % — LOW (ref 39–50)
HGB BLD-MCNC: 12.8 G/DL — LOW (ref 13–17)
IMM GRANULOCYTES NFR BLD AUTO: 0.4 % — SIGNIFICANT CHANGE UP (ref 0–0.9)
KETONES UR-MCNC: NEGATIVE — SIGNIFICANT CHANGE UP
LEUKOCYTE ESTERASE UR-ACNC: NEGATIVE — SIGNIFICANT CHANGE UP
LYMPHOCYTES # BLD AUTO: 1.91 K/UL — SIGNIFICANT CHANGE UP (ref 1–3.3)
LYMPHOCYTES # BLD AUTO: 35.3 % — SIGNIFICANT CHANGE UP (ref 13–44)
MAGNESIUM SERPL-MCNC: 2 MG/DL — SIGNIFICANT CHANGE UP (ref 1.6–2.6)
MCHC RBC-ENTMCNC: 31.9 PG — SIGNIFICANT CHANGE UP (ref 27–34)
MCHC RBC-ENTMCNC: 35.3 GM/DL — SIGNIFICANT CHANGE UP (ref 32–36)
MCV RBC AUTO: 90.5 FL — SIGNIFICANT CHANGE UP (ref 80–100)
MONOCYTES # BLD AUTO: 0.46 K/UL — SIGNIFICANT CHANGE UP (ref 0–0.9)
MONOCYTES NFR BLD AUTO: 8.5 % — SIGNIFICANT CHANGE UP (ref 2–14)
NEUTROPHILS # BLD AUTO: 2.87 K/UL — SIGNIFICANT CHANGE UP (ref 1.8–7.4)
NEUTROPHILS NFR BLD AUTO: 53.1 % — SIGNIFICANT CHANGE UP (ref 43–77)
NITRITE UR-MCNC: NEGATIVE — SIGNIFICANT CHANGE UP
NRBC # BLD: 0 /100 WBCS — SIGNIFICANT CHANGE UP (ref 0–0)
OSMOLALITY UR: 282 MOSM/KG — LOW (ref 300–900)
PH UR: 6 — SIGNIFICANT CHANGE UP (ref 5–8)
PHOSPHATE SERPL-MCNC: 3.8 MG/DL — SIGNIFICANT CHANGE UP (ref 2.5–4.5)
PLATELET # BLD AUTO: 279 K/UL — SIGNIFICANT CHANGE UP (ref 150–400)
POTASSIUM SERPL-MCNC: 4.2 MMOL/L — SIGNIFICANT CHANGE UP (ref 3.5–5.3)
POTASSIUM SERPL-MCNC: 4.3 MMOL/L — SIGNIFICANT CHANGE UP (ref 3.5–5.3)
POTASSIUM SERPL-SCNC: 4.2 MMOL/L — SIGNIFICANT CHANGE UP (ref 3.5–5.3)
POTASSIUM SERPL-SCNC: 4.3 MMOL/L — SIGNIFICANT CHANGE UP (ref 3.5–5.3)
POTASSIUM UR-SCNC: 34 MMOL/L — SIGNIFICANT CHANGE UP
PROT ?TM UR-MCNC: 22 MG/DL — HIGH (ref 0–12)
PROT SERPL-MCNC: 7.9 G/DL — SIGNIFICANT CHANGE UP (ref 6–8.3)
PROT UR-MCNC: ABNORMAL MG/DL
PROT/CREAT UR-RTO: 0.2 RATIO — SIGNIFICANT CHANGE UP (ref 0–0.2)
RBC # BLD: 4.01 M/UL — LOW (ref 4.2–5.8)
RBC # FLD: 11.6 % — SIGNIFICANT CHANGE UP (ref 10.3–14.5)
RBC CASTS # UR COMP ASSIST: < 5 /HPF — SIGNIFICANT CHANGE UP
SODIUM SERPL-SCNC: 137 MMOL/L — SIGNIFICANT CHANGE UP (ref 135–145)
SODIUM SERPL-SCNC: 138 MMOL/L — SIGNIFICANT CHANGE UP (ref 135–145)
SODIUM UR-SCNC: 42 MMOL/L — SIGNIFICANT CHANGE UP
SP GR SPEC: 1.01 — SIGNIFICANT CHANGE UP (ref 1–1.03)
SPECIMEN SOURCE: SIGNIFICANT CHANGE UP
UROBILINOGEN FLD QL: 0.2 E.U./DL — SIGNIFICANT CHANGE UP
UUN UR-MCNC: 343 MG/DL — SIGNIFICANT CHANGE UP
VANCOMYCIN TROUGH SERPL-MCNC: 22.9 UG/ML — HIGH (ref 10–20)
WBC # BLD: 5.41 K/UL — SIGNIFICANT CHANGE UP (ref 3.8–10.5)
WBC # FLD AUTO: 5.41 K/UL — SIGNIFICANT CHANGE UP (ref 3.8–10.5)
WBC UR QL: < 5 /HPF — SIGNIFICANT CHANGE UP

## 2023-07-31 PROCEDURE — 99233 SBSQ HOSP IP/OBS HIGH 50: CPT | Mod: GC

## 2023-07-31 PROCEDURE — 88311 DECALCIFY TISSUE: CPT | Mod: 26

## 2023-07-31 PROCEDURE — 88307 TISSUE EXAM BY PATHOLOGIST: CPT | Mod: 26

## 2023-07-31 PROCEDURE — 99254 IP/OBS CNSLTJ NEW/EST MOD 60: CPT

## 2023-07-31 RX ORDER — CEFEPIME 1 G/1
2000 INJECTION, POWDER, FOR SOLUTION INTRAMUSCULAR; INTRAVENOUS ONCE
Refills: 0 | Status: COMPLETED | OUTPATIENT
Start: 2023-07-31 | End: 2023-07-31

## 2023-07-31 RX ORDER — DAPTOMYCIN 500 MG/10ML
900 INJECTION, POWDER, LYOPHILIZED, FOR SOLUTION INTRAVENOUS EVERY 24 HOURS
Refills: 0 | Status: DISCONTINUED | OUTPATIENT
Start: 2023-07-31 | End: 2023-08-02

## 2023-07-31 RX ORDER — CEFEPIME 1 G/1
INJECTION, POWDER, FOR SOLUTION INTRAMUSCULAR; INTRAVENOUS
Refills: 0 | Status: DISCONTINUED | OUTPATIENT
Start: 2023-07-31 | End: 2023-08-01

## 2023-07-31 RX ORDER — CEFEPIME 1 G/1
2000 INJECTION, POWDER, FOR SOLUTION INTRAMUSCULAR; INTRAVENOUS EVERY 8 HOURS
Refills: 0 | Status: DISCONTINUED | OUTPATIENT
Start: 2023-07-31 | End: 2023-08-01

## 2023-07-31 RX ADMIN — Medication 2: at 18:37

## 2023-07-31 RX ADMIN — Medication 2 UNIT(S): at 18:37

## 2023-07-31 RX ADMIN — Medication 2 UNIT(S): at 13:23

## 2023-07-31 RX ADMIN — Medication 81 MILLIGRAM(S): at 13:29

## 2023-07-31 RX ADMIN — CEFEPIME 100 MILLIGRAM(S): 1 INJECTION, POWDER, FOR SOLUTION INTRAMUSCULAR; INTRAVENOUS at 22:19

## 2023-07-31 RX ADMIN — INSULIN GLARGINE 35 UNIT(S): 100 INJECTION, SOLUTION SUBCUTANEOUS at 22:19

## 2023-07-31 RX ADMIN — DAPTOMYCIN 136 MILLIGRAM(S): 500 INJECTION, POWDER, LYOPHILIZED, FOR SOLUTION INTRAVENOUS at 17:50

## 2023-07-31 RX ADMIN — Medication 2 UNIT(S): at 09:25

## 2023-07-31 RX ADMIN — CEFEPIME 100 MILLIGRAM(S): 1 INJECTION, POWDER, FOR SOLUTION INTRAMUSCULAR; INTRAVENOUS at 17:50

## 2023-07-31 RX ADMIN — BICTEGRAVIR SODIUM, EMTRICITABINE, AND TENOFOVIR ALAFENAMIDE FUMARATE 1 TABLET(S): 30; 120; 15 TABLET ORAL at 09:24

## 2023-07-31 RX ADMIN — Medication 4: at 22:19

## 2023-07-31 RX ADMIN — PIPERACILLIN AND TAZOBACTAM 25 GRAM(S): 4; .5 INJECTION, POWDER, LYOPHILIZED, FOR SOLUTION INTRAVENOUS at 03:37

## 2023-07-31 RX ADMIN — PIPERACILLIN AND TAZOBACTAM 25 GRAM(S): 4; .5 INJECTION, POWDER, LYOPHILIZED, FOR SOLUTION INTRAVENOUS at 13:29

## 2023-07-31 NOTE — OCCUPATIONAL THERAPY INITIAL EVALUATION ADULT - DIAGNOSIS, OT EVAL
Pt presenting to [facility] with R foot osteomyelitis resulting in reduced balance and pain limiting pt's functional mobility, transfers, and ADL performance. Pt presenting to [facility] with R foot osteomyelitis. Upon evaluation and despite pain, pt able to perform all ADL and functional mobility with independence. No further acute OT needs; pt will be discharged from program.

## 2023-07-31 NOTE — OCCUPATIONAL THERAPY INITIAL EVALUATION ADULT - ADDITIONAL COMMENTS
Pt lives in elevator apartment with 0 JOSHUA. Pt independent at baseline without use of device for mobility. Pt R handed and does not wear glasses.

## 2023-07-31 NOTE — CONSULT NOTE ADULT - NS ATTEND AMEND GEN_ALL_CORE FT
48M h/o well controlled HIV on BIC/TFC/TAF (VLUD, OG0=594 in 1/2023), CKD3, T2DM, HTN, partial R great toe amputation in 1/2022 c/b OM of stump s/p dapto/CTX x 6 weeks (ended 6/6/23) p/w R greast toe swelling/pain x 1 day. Patient developed R toe stump ulcer about 3 months PTA which developed into acute OM in 4/2023. 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  No culture info. He was put on empiric dapto/CTX and was treated with 6 weeks course, which ended on 6/6/23. He moved to [facility] on 6/10/23 to take care of her demented mother. In early July, he saw a podiatrist here and foot ulcer was evaluated. A day PTA, he wet his foot and the next day his R 1st toe ulcer became painful and swollen, thus he came to the hospital .Denied fever/cihlls, n/v/d, abdominal pain.  upon admission, he wa afebrile, VSS, WBC normal, ESR 51, CRp 32. Seen by podiatry, vanc/zosyn started. MRI c/f OM. He underwent wound debridement at bedside and WCx growing PsA, Enterobacter cloacae complex, and E. faecalis. Bone bopsy taken today .ID was consulted for abx management. As for HIV Hx: Diagnosed in 2012 in setting of PCP pneumonia, used to be on Atripla and now on Biktarvy, reports compliance and VLUD for a long time. Current PMD is Dr. Antony at [facility].  During the hospital stay, Cr uptrending, c/w VIRAL. Stop vanco/zosyn.  Start dapto 900mg IV q24h, check CK.  Start cefepime 2g IV q8h.  f/u WCx and bone biopsy.     Team 2 will follow you.  Case d/w primary team.    Stephanie Olmos MD, MS  Infectious Disease attending  work cell [phone]   For any questions during evening/weekend/holiday, please page ID on call 48M h/o well controlled HIV on BIC/TFC/TAF (VLUD, MU0=042 in 1/2023), CKD3, T2DM, HTN, partial R great toe amputation in 1/2022 c/b OM of stump s/p dapto/CTX x 6 weeks (ended 6/6/23) p/w R greast toe swelling/pain x 1 day. Patient developed R toe stump ulcer about 3 months PTA which developed into acute OM in 4/2023. 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  No culture info. He was put on empiric dapto/CTX and was treated with 6 weeks course, which ended on 6/6/23. He moved to [facility] on 6/10/23 to take care of her demented mother. In early July, he saw a podiatrist here and foot ulcer was evaluated. A day PTA, he wet his foot and the next day his R 1st toe ulcer became painful and swollen, thus he came to the hospital .Denied fever/cihlls, n/v/d, abdominal pain.  upon admission, he wa afebrile, VSS, WBC normal, ESR 51, CRp 32. Seen by podiatry, vanc/zosyn started. MRI c/f OM. He underwent wound debridement at bedside and WCx growing PsA, Enterobacter cloacae complex, and E. faecalis. Bone bopsy taken today .ID was consulted for abx management. As for HIV Hx: Diagnosed in 2012 in setting of PCP pneumonia, used to be on Atripla and now on Biktarvy, reports compliance and VLUD for a long time. Current PMD is Dr. Antony at [facility].  During the hospital stay, Cr uptrending, c/w VIRAL. Stop vanco/zosyn.  Start dapto 900mg IV q24h, check CK.  Start cefepime 2g IV q8h.  f/u WCx and bone biopsy.   Cont Biktarvy 1 tab daily.    Team 2 will follow you.  Case d/w primary team.    Stephanie Olmos MD, MS  Infectious Disease attending  work cell [phone]   For any questions during evening/weekend/holiday, please page ID on call

## 2023-07-31 NOTE — PROGRESS NOTE ADULT - ASSESSMENT
47 y/o M with pmh HIV, DM, HTN, Partial R hallux amp(1/2022) admitted for R toe wound previously found to have OM & treated with 6 weeks IV abx.  XR with osteomyelitis of 1st proximal phalanx head. At time of consult, VSS, WBC 5.64, ESR 56, CRP 44.6. MRI with findings consistent of OM of the 1st ray proximal phalanx. High suspicion for Osteomyelitis of R hallux.     Plan:     - Pt continues to refuse surgical intervention. Amenable to 6 weeks of antibiotics for osteomyelitis.   - Recommend ID consult for treatment of OM.   - C/w IV antibiotics   - Local wound care: Cleansed with NS. Applied betadine soaked gauze, kerlix with tape.   - F/u wound cx  - Rest of care per primary team    Plan d/w Attending. Podiatry following.

## 2023-07-31 NOTE — PROCEDURE NOTE - GENERAL PROCEDURE DETAILS
Pt given pre-op local anesthetic consisting of 7 ccs of 1% lidocaine plain. Incision made on medial hallux using #15 blade. Using hemostat, soft tissue spread down to level of bone. Using jamshidi trochar needle, broke through cortex of bone. Usiing trochar, 2 specimens of bone core were obtained. One was sent for pathology and one for culture. Surgical site wrapped with gauze, kerlix, and ACE.

## 2023-07-31 NOTE — OCCUPATIONAL THERAPY INITIAL EVALUATION ADULT - MODIFIED CLINICAL TEST OF SENSORY INTEGRATION IN BALANCE TEST
Pt performed functional mobility within room and hallways approx 60 ftx2 without device independently

## 2023-07-31 NOTE — PROGRESS NOTE ADULT - PROBLEM SELECTOR PLAN 2
- creatinine 1.66 on admission; downtrending overall but increased from 1.36 to 1.49 overnight  - intrinsic (vanc tox) vs. pre-renal origin due to hypotension in ED   - patient verbalized that on prior admission for OM, inpatient team was monitoring elevated CK levels while on vanc  - follow up baseline Cr from [facility] - creatinine 1.66 on admission; Cr worsened from 1.51 to 2.25 today. Repeat Cr showed 2.26  - intrinsic (vanc tox) vs. pre-renal origin due to hypotension in ED   - patient verbalized that on prior admission for OM, inpatient team was monitoring elevated CK levels while on vanc  - baseline Cr prior to hospitalization was 1.5 per PCP (Dr. Dena Ochoa) records from patient online portal   - f/u Vanc trough  - UA  - Urine lytes to explore source of kidney injury   - Bladder scan - creatinine 1.66 on admission; Cr worsened from 1.51 to 2.25 today. Repeat Cr showed 2.26  - intrinsic (vanc and zosyn nephrotoxicity) vs. pre-renal origin due to hypotension in ED   - patient verbalized that on prior admission for OM, inpatient team was monitoring elevated CK levels while on vanc  - baseline Cr prior to hospitalization was 1.5 per PCP (Dr. Dena Ochoa) records from patient online portal   - f/u Vanc trough  - UA  - Urine lytes to explore source of kidney injury   - Bladder scan

## 2023-07-31 NOTE — PROGRESS NOTE ADULT - SUBJECTIVE AND OBJECTIVE BOX
OVERNIGHT EVENTS: No acute events overnight.     SUBJECTIVE / INTERVAL HPI: Patient seen and examined at bedside - no complaints. He denies any foot pain, shortness of breath, abdominal pain.    Vital Signs Last 24 Hrs  T(C): 36.3 (31 Jul 2023 08:56), Max: 37.1 (30 Jul 2023 21:21)  T(F): 97.3 (31 Jul 2023 08:56), Max: 98.8 (31 Jul 2023 05:32)  HR: 81 (31 Jul 2023 08:56) (81 - 90)  BP: 139/77 (31 Jul 2023 08:56) (136/78 - 166/104)  BP(mean): 117 (30 Jul 2023 16:08) (117 - 117)  ABP: --  ABP(mean): --  RR: 16 (31 Jul 2023 08:56) (16 - 18)  SpO2: 98% (31 Jul 2023 08:56) (97% - 98%)    O2 Parameters below as of 31 Jul 2023 08:56  Patient On (Oxygen Delivery Method): room air      PHYSICAL EXAM:    General: NAD  HEENT: NC/AT; anicteric sclera; MMM  Neck: supple w/o palpable nodularity  Cardiovascular: +S1/S2; RRR  Respiratory: CTA B/L; no W/R/R  Gastrointestinal: soft, NT/ND; +BS  Extremities: R distal great toe s/p amputation. R LE wrapped, unable to visualize ulcer. R foot warm, mild edema, no erythema. L lower extremity unremarkable.  Vascular: 2+ radial, 1+ DP/PT pulses B/L  Neurological: AAOx3; no focal deficits    MEDICATIONS:  MEDICATIONS  (STANDING):  aspirin  chewable 81 milliGRAM(s) Oral every 24 hours  bictegravir 50 mG/emtricitabine 200 mG/tenofovir alafenamide 25 mG (BIKTARVY) 1 Tablet(s) Oral every 24 hours  dextrose 5%. 1000 milliLiter(s) (50 mL/Hr) IV Continuous <Continuous>  dextrose 5%. 1000 milliLiter(s) (100 mL/Hr) IV Continuous <Continuous>  dextrose 50% Injectable 25 Gram(s) IV Push once  dextrose 50% Injectable 12.5 Gram(s) IV Push once  dextrose 50% Injectable 25 Gram(s) IV Push once  glucagon  Injectable 1 milliGRAM(s) IntraMuscular once  heparin   Injectable 5000 Unit(s) SubCutaneous every 8 hours  insulin glargine Injectable (LANTUS) 50 Unit(s) SubCutaneous at bedtime  insulin lispro (ADMELOG) corrective regimen sliding scale   SubCutaneous Before meals and at bedtime  losartan 25 milliGRAM(s) Oral every 24 hours  piperacillin/tazobactam IVPB.. 4.5 Gram(s) IV Intermittent every 8 hours  vancomycin  IVPB 1750 milliGRAM(s) IV Intermittent every 12 hours    MEDICATIONS  (PRN):  acetaminophen     Tablet .. 650 milliGRAM(s) Oral every 6 hours PRN Mild Pain (1 - 3)  dextrose Oral Gel 15 Gram(s) Oral once PRN Blood Glucose LESS THAN 70 milliGRAM(s)/deciliter      ALLERGIES:  Allergies    shellfish (Short breath)  doxycycline (Diarrhea)    Intolerances        LABS:                               12.8   5.41  )-----------( 279      ( 31 Jul 2023 05:30 )             36.3       07-31    138  |  104  |  20  ----------------------------<  123<H>  4.3   |  25  |  2.25<H>    Ca    8.7      31 Jul 2023 05:30  Phos  3.8     07-31  Mg     2.0     07-31    TPro  7.9  /  Alb  3.4  /  TBili  0.4  /  DBili  x   /  AST  15  /  ALT  10  /  AlkPhos  76  07-31           CAPILLARY BLOOD GLUCOSE      POCT Blood Glucose.: 79 mg/dL (29 Jul 2023 09:36)  POCT Blood Glucose.: 168 mg/dL (28 Jul 2023 21:50)  POCT Blood Glucose.: 173 mg/dL (28 Jul 2023 18:31)  POCT Blood Glucose.: 221 mg/dL (28 Jul 2023 17:25)  POCT Blood Glucose.: 141 mg/dL (28 Jul 2023 13:38)      RADIOLOGY & ADDITIONAL TESTS:   MR FOOT WITH IV CONTRAST RIGHT dated 7/29/2023 9:52 AM    INDICATION: Pain and swelling at the big toe    IMPRESSION:    1.  Plantar skin wound with soft tissue tract extending to the proximal   phalanx at the great toe. Rim-enhancing fluid within the tract concerning   for infection.  2.  Diffuse soft tissue swelling as can be seen with cellulitis.  3.  Abnormal marrow signal and erosive change involving the first   proximal phalanx. Given the overlying skin wound, findings areconcerning   for osteomyelitis.  4.  First metatarsophalangeal joint effusion, nonspecific finding that   can be seen with infection in the appropriate clinical setting. If   further evaluation is warranted, aspiration can be performed.

## 2023-07-31 NOTE — OCCUPATIONAL THERAPY INITIAL EVALUATION ADULT - GENERAL OBSERVATIONS, REHAB EVAL
Pt received in semisupine in bed +IV +NAD, VALERY Jones cleared pt for OT. Pt left with all lines intact and needs met, returned to semisupine in bed. Pt received in semisupine in bed +IV +NAD +R foot dressing C/I/D, VALERY Jones cleared pt for OT. Pt left with all lines intact and needs met, returned to semisupine in bed.

## 2023-07-31 NOTE — CONSULT NOTE ADULT - SUBJECTIVE AND OBJECTIVE BOX
INFECTIOUS DISEASES INITIAL CONSULT NOTE    HPI:  48M with pmhx of HIV (compliant with Biktarvy), partial great toe amputation in Jan 2022, recent R great toe OM in May (s/p 6 week course of Cefepime and Vancomycin), DM, HTN who presents for a 1 day history of right foot pain and swelling. Pt notes he has an open wound to the R great toe that he has had for the past 3 months. He was recently admitted to a hospital in [place] for OM of the R great toe and received a 6 week course of Vanc and Cefepime. Pt reports he then followed up with his podiatrist who completed an additional xray, which showed improvement in the infection. Pt states approximately 2 weeks ago he then developed slight drainage from the wound and followed up with his Podiatrist, who prescribed a 5 day course of Bactrim, which he completed. He then performed wound care himself the past 2 weeks, however two days ago, he said the wound was exposed to water and he subsequently developed pain and swelling, which is what prompted him to come to the ED. Denies fevers, chills, chest pain, shortness of breath, abdominal pain, n/v/d, numbness/tingling in the leg. Denies any bleeding or draining from the wound over the last few days.    In the ED:  Vitals: 98.4,  --> 83, /96, 96% on RA  Labs: WBC 8.90, Hgb 12.3, Cr 1.66, CRP 32, lactate 1.6  Imaging: Xray of R foot: erosive changes of the first proximal phalanx head, concerning for OM  RLE US; negative for DVT  Interventions: Tylenol 650mg PO qD, Toradol 15mg IVP, Zosyn 4.5IV, Vanc 1500mg IV, NS 2.8L (27 Jul 2023 23:49)      PAST MEDICAL & SURGICAL HISTORY:  HIV disease      Gout            Review of Systems:   Constitutional, eyes, ENT, cardiovascular, respiratory, gastrointestinal, genitourinary, integumentary, neurological, psychiatric and heme/lymph are otherwise negative other than noted above       ANTIBIOTICS:  MEDICATIONS  (STANDING):  aspirin  chewable 81 milliGRAM(s) Oral every 24 hours  bictegravir 50 mG/emtricitabine 200 mG/tenofovir alafenamide 25 mG (BIKTARVY) 1 Tablet(s) Oral every 24 hours  dextrose 5%. 1000 milliLiter(s) (50 mL/Hr) IV Continuous <Continuous>  dextrose 5%. 1000 milliLiter(s) (100 mL/Hr) IV Continuous <Continuous>  dextrose 50% Injectable 25 Gram(s) IV Push once  dextrose 50% Injectable 12.5 Gram(s) IV Push once  dextrose 50% Injectable 25 Gram(s) IV Push once  glucagon  Injectable 1 milliGRAM(s) IntraMuscular once  heparin   Injectable 5000 Unit(s) SubCutaneous every 8 hours  insulin glargine Injectable (LANTUS) 35 Unit(s) SubCutaneous at bedtime  insulin lispro (ADMELOG) corrective regimen sliding scale   SubCutaneous Before meals and at bedtime  insulin lispro Injectable (ADMELOG) 2 Unit(s) SubCutaneous three times a day before meals  piperacillin/tazobactam IVPB.. 4.5 Gram(s) IV Intermittent every 8 hours  polyethylene glycol 3350 17 Gram(s) Oral two times a day    MEDICATIONS  (PRN):  acetaminophen     Tablet .. 650 milliGRAM(s) Oral every 6 hours PRN Mild Pain (1 - 3)  dextrose Oral Gel 15 Gram(s) Oral once PRN Blood Glucose LESS THAN 70 milliGRAM(s)/deciliter      Allergies    shellfish (Short breath)  doxycycline (Diarrhea)    Intolerances        SOCIAL HISTORY:    FAMILY HISTORY:   no FH leading to current infection    Vital Signs Last 24 Hrs  T(C): 36.3 (31 Jul 2023 08:56), Max: 37.1 (30 Jul 2023 21:21)  T(F): 97.3 (31 Jul 2023 08:56), Max: 98.8 (31 Jul 2023 05:32)  HR: 81 (31 Jul 2023 08:56) (81 - 90)  BP: 139/77 (31 Jul 2023 08:56) (136/78 - 166/104)  BP(mean): 117 (30 Jul 2023 16:08) (117 - 117)  RR: 16 (31 Jul 2023 08:56) (16 - 18)  SpO2: 98% (31 Jul 2023 08:56) (97% - 98%)    Parameters below as of 31 Jul 2023 08:56  Patient On (Oxygen Delivery Method): room air        07-30-23 @ 07:01  -  07-31-23 @ 07:00  --------------------------------------------------------  IN: 600 mL / OUT: 0 mL / NET: 600 mL        PHYSICAL EXAM:  Constitutional: alert, NAD  Eyes: the sclera and conjunctiva were normal.   ENT: the ears and nose were normal in appearance.   Neck: the appearance of the neck was normal and the neck was supple.   Pulmonary: no respiratory distress and lungs were clear to auscultation bilaterally.   Heart: heart rate was normal and rhythm regular, normal S1 and S2  Vascular:. there was no peripheral edema  Abdomen: normal bowel sounds, soft, non-tender  Neurological: no focal deficits.   Psychiatric: the affect was normal      LABS:                        12.8   5.41  )-----------( 279      ( 31 Jul 2023 05:30 )             36.3     07-31    138  |  104  |  20  ----------------------------<  123<H>  4.3   |  25  |  2.25<H>    Ca    8.7      31 Jul 2023 05:30  Phos  3.8     07-31  Mg     2.0     07-31    TPro  7.9  /  Alb  3.4  /  TBili  0.4  /  DBili  x   /  AST  15  /  ALT  10  /  AlkPhos  76  07-31      Urinalysis Basic - ( 31 Jul 2023 05:30 )    Color: x / Appearance: x / SG: x / pH: x  Gluc: 123 mg/dL / Ketone: x  / Bili: x / Urobili: x   Blood: x / Protein: x / Nitrite: x   Leuk Esterase: x / RBC: x / WBC x   Sq Epi: x / Non Sq Epi: x / Bacteria: x        MICROBIOLOGY:    RADIOLOGY & ADDITIONAL STUDIES:   INFECTIOUS DISEASES INITIAL CONSULT NOTE    HPI:  48M with HIV (compliant with Biktarvy- VLUD and CD4 368 last checked in Jan), DM, HTN, partial great toe amputation in Jan 2022, recent R great toe OM in April (s/p 6 week course of CTX and Daptomycin) who presents for a 1 day history of right foot pain and swelling. ID consulted for antibiotic management of OM. Pt notes he has an open wound to the R great toe that he has had for the past 3 months. Denies injuries- states wound started as a blister. He was recently admitted to a hospital in [place] in April for OM of the R great toe and received a 6 week course of Daptomycin and CTX which he completed. Pt reports he then followed up with his podiatrist who completed an additional xray, which showed improvement in the infection. Pt notes that last Wednesday, he got his wound wet in shower and then next day developed foot swelling and pain. He is unsure if there was purulent drainage. Denies fevers, chills, N/V PTA. He presented to ED that same day- afebrile, VSS, no leukocytosis. Podiatry exam +PTB, Xray and MRI of R foot with signs c/f OM. He was started on vanc and zosyn empirically.       PAST MEDICAL & SURGICAL HISTORY:  HIV disease      Gout            Review of Systems:   Constitutional, eyes, ENT, cardiovascular, respiratory, gastrointestinal, genitourinary, integumentary, neurological, psychiatric and heme/lymph are otherwise negative other than noted above       ANTIBIOTICS:  MEDICATIONS  (STANDING):  aspirin  chewable 81 milliGRAM(s) Oral every 24 hours  bictegravir 50 mG/emtricitabine 200 mG/tenofovir alafenamide 25 mG (BIKTARVY) 1 Tablet(s) Oral every 24 hours  dextrose 5%. 1000 milliLiter(s) (50 mL/Hr) IV Continuous <Continuous>  dextrose 5%. 1000 milliLiter(s) (100 mL/Hr) IV Continuous <Continuous>  dextrose 50% Injectable 25 Gram(s) IV Push once  dextrose 50% Injectable 12.5 Gram(s) IV Push once  dextrose 50% Injectable 25 Gram(s) IV Push once  glucagon  Injectable 1 milliGRAM(s) IntraMuscular once  heparin   Injectable 5000 Unit(s) SubCutaneous every 8 hours  insulin glargine Injectable (LANTUS) 35 Unit(s) SubCutaneous at bedtime  insulin lispro (ADMELOG) corrective regimen sliding scale   SubCutaneous Before meals and at bedtime  insulin lispro Injectable (ADMELOG) 2 Unit(s) SubCutaneous three times a day before meals  piperacillin/tazobactam IVPB.. 4.5 Gram(s) IV Intermittent every 8 hours  polyethylene glycol 3350 17 Gram(s) Oral two times a day    MEDICATIONS  (PRN):  acetaminophen     Tablet .. 650 milliGRAM(s) Oral every 6 hours PRN Mild Pain (1 - 3)  dextrose Oral Gel 15 Gram(s) Oral once PRN Blood Glucose LESS THAN 70 milliGRAM(s)/deciliter      Allergies    shellfish (Short breath)  doxycycline (Diarrhea)    Intolerances        SOCIAL HISTORY:  -Born in [place], then lived in [place], then [place] for 3 years. Just moved back to [place] in June to take care of his mother who has dementia.   -aspiring to return to nursing school  -dx with HIV in 2012 i/s/o PCP   -Was on Atripla, now on Biktarvy   -denies tobacco, does drink socially but rarely. He denies recreational drug use     FAMILY HISTORY:   no FH leading to current infection    Vital Signs Last 24 Hrs  T(C): 36.3 (31 Jul 2023 08:56), Max: 37.1 (30 Jul 2023 21:21)  T(F): 97.3 (31 Jul 2023 08:56), Max: 98.8 (31 Jul 2023 05:32)  HR: 81 (31 Jul 2023 08:56) (81 - 90)  BP: 139/77 (31 Jul 2023 08:56) (136/78 - 166/104)  BP(mean): 117 (30 Jul 2023 16:08) (117 - 117)  RR: 16 (31 Jul 2023 08:56) (16 - 18)  SpO2: 98% (31 Jul 2023 08:56) (97% - 98%)    Parameters below as of 31 Jul 2023 08:56  Patient On (Oxygen Delivery Method): room air        07-30-23 @ 07:01  -  07-31-23 @ 07:00  --------------------------------------------------------  IN: 600 mL / OUT: 0 mL / NET: 600 mL        PHYSICAL EXAM:  Constitutional: alert, NAD  Eyes: the sclera and conjunctiva were normal.   ENT: the ears and nose were normal in appearance.   Neck: the appearance of the neck was normal and the neck was supple.   Pulmonary: no respiratory distress and lungs were clear to auscultation bilaterally.   Heart: heart rate was normal and rhythm regular, normal S1 and S2  Vascular:. there was no peripheral edema  Abdomen: normal bowel sounds, soft, non-tender  Extremities: R foot wrapped by podiatry   Neurological: no focal deficits.   Psychiatric: the affect was normal      LABS:                        12.8   5.41  )-----------( 279      ( 31 Jul 2023 05:30 )             36.3     07-31    138  |  104  |  20  ----------------------------<  123<H>  4.3   |  25  |  2.25<H>    Ca    8.7      31 Jul 2023 05:30  Phos  3.8     07-31  Mg     2.0     07-31    TPro  7.9  /  Alb  3.4  /  TBili  0.4  /  DBili  x   /  AST  15  /  ALT  10  /  AlkPhos  76  07-31      Urinalysis Basic - ( 31 Jul 2023 05:30 )    Color: x / Appearance: x / SG: x / pH: x  Gluc: 123 mg/dL / Ketone: x  / Bili: x / Urobili: x   Blood: x / Protein: x / Nitrite: x   Leuk Esterase: x / RBC: x / WBC x   Sq Epi: x / Non Sq Epi: x / Bacteria: x        MICROBIOLOGY:  reviewed   RADIOLOGY & ADDITIONAL STUDIES:  reviewed  INFECTIOUS DISEASES INITIAL CONSULT NOTE    HPI:  48M with HIV (compliant with Biktarvy- VLUD and CD4 368 last checked in Jan), DM, HTN, partial great toe amputation in Jan 2022, recent R great toe OM in April (s/p 6 week course of CTX and Daptomycin) who presents for a 1 day history of right foot pain and swelling. ID consulted for antibiotic management of OM. Pt notes he has an open wound to the R great toe that he has had for the past 3 months. Denies injuries- states wound started as a blister. He was recently admitted to a hospital in [place] in April for OM of the R great toe and received a 6 week course of Daptomycin and CTX which he completed. Pt reports he then followed up with his podiatrist who completed an additional xray, which showed improvement in the infection. Pt notes that last Wednesday, he got his wound wet in shower and then next day developed foot swelling and pain. He is unsure if there was purulent drainage. Denies fevers, chills, N/V PTA. He presented to ED that same day- afebrile, VSS, no leukocytosis. Podiatry exam +PTB, Xray and MRI of R foot with signs c/f OM. He was started on vanc and zosyn empirically.       PAST MEDICAL & SURGICAL HISTORY:  HIV disease      Gout            Review of Systems:   Constitutional, eyes, ENT, cardiovascular, respiratory, gastrointestinal, genitourinary, integumentary, neurological, psychiatric and heme/lymph are otherwise negative other than noted above       ANTIBIOTICS:  MEDICATIONS  (STANDING):  aspirin  chewable 81 milliGRAM(s) Oral every 24 hours  bictegravir 50 mG/emtricitabine 200 mG/tenofovir alafenamide 25 mG (BIKTARVY) 1 Tablet(s) Oral every 24 hours  dextrose 5%. 1000 milliLiter(s) (50 mL/Hr) IV Continuous <Continuous>  dextrose 5%. 1000 milliLiter(s) (100 mL/Hr) IV Continuous <Continuous>  dextrose 50% Injectable 25 Gram(s) IV Push once  dextrose 50% Injectable 12.5 Gram(s) IV Push once  dextrose 50% Injectable 25 Gram(s) IV Push once  glucagon  Injectable 1 milliGRAM(s) IntraMuscular once  heparin   Injectable 5000 Unit(s) SubCutaneous every 8 hours  insulin glargine Injectable (LANTUS) 35 Unit(s) SubCutaneous at bedtime  insulin lispro (ADMELOG) corrective regimen sliding scale   SubCutaneous Before meals and at bedtime  insulin lispro Injectable (ADMELOG) 2 Unit(s) SubCutaneous three times a day before meals  piperacillin/tazobactam IVPB.. 4.5 Gram(s) IV Intermittent every 8 hours  polyethylene glycol 3350 17 Gram(s) Oral two times a day    MEDICATIONS  (PRN):  acetaminophen     Tablet .. 650 milliGRAM(s) Oral every 6 hours PRN Mild Pain (1 - 3)  dextrose Oral Gel 15 Gram(s) Oral once PRN Blood Glucose LESS THAN 70 milliGRAM(s)/deciliter      Allergies    shellfish (Short breath)  doxycycline (Diarrhea)    Intolerances        SOCIAL HISTORY:  -Born in [place], then lived in [place], then [place] for 3 years. Just moved back to [place] in June to take care of his mother who has dementia.   -aspiring to return to nursing school  -dx with HIV in 2012 i/s/o PCP pneumonia  -Was on Atripla, now on Biktarvy   -denies tobacco, does drink socially but rarely. He denies recreational drug use     FAMILY HISTORY:   no FH leading to current infection    Vital Signs Last 24 Hrs  T(C): 36.3 (31 Jul 2023 08:56), Max: 37.1 (30 Jul 2023 21:21)  T(F): 97.3 (31 Jul 2023 08:56), Max: 98.8 (31 Jul 2023 05:32)  HR: 81 (31 Jul 2023 08:56) (81 - 90)  BP: 139/77 (31 Jul 2023 08:56) (136/78 - 166/104)  BP(mean): 117 (30 Jul 2023 16:08) (117 - 117)  RR: 16 (31 Jul 2023 08:56) (16 - 18)  SpO2: 98% (31 Jul 2023 08:56) (97% - 98%)    Parameters below as of 31 Jul 2023 08:56  Patient On (Oxygen Delivery Method): room air        07-30-23 @ 07:01  -  07-31-23 @ 07:00  --------------------------------------------------------  IN: 600 mL / OUT: 0 mL / NET: 600 mL        PHYSICAL EXAM:  Constitutional: alert, NAD  Eyes: the sclera and conjunctiva were normal.   ENT: the ears and nose were normal in appearance.   Neck: the appearance of the neck was normal and the neck was supple.   Pulmonary: no respiratory distress and lungs were clear to auscultation bilaterally.   Heart: heart rate was normal and rhythm regular, normal S1 and S2  Vascular:. there was no peripheral edema  Abdomen: normal bowel sounds, soft, non-tender  Extremities: R foot wrapped by podiatry   Neurological: no focal deficits.   Psychiatric: the affect was normal      LABS:                        12.8   5.41  )-----------( 279      ( 31 Jul 2023 05:30 )             36.3     07-31    138  |  104  |  20  ----------------------------<  123<H>  4.3   |  25  |  2.25<H>    Ca    8.7      31 Jul 2023 05:30  Phos  3.8     07-31  Mg     2.0     07-31    TPro  7.9  /  Alb  3.4  /  TBili  0.4  /  DBili  x   /  AST  15  /  ALT  10  /  AlkPhos  76  07-31      Urinalysis Basic - ( 31 Jul 2023 05:30 )    Color: x / Appearance: x / SG: x / pH: x  Gluc: 123 mg/dL / Ketone: x  / Bili: x / Urobili: x   Blood: x / Protein: x / Nitrite: x   Leuk Esterase: x / RBC: x / WBC x   Sq Epi: x / Non Sq Epi: x / Bacteria: x        MICROBIOLOGY:  reviewed   RADIOLOGY & ADDITIONAL STUDIES:  reviewed

## 2023-07-31 NOTE — PROGRESS NOTE ADULT - PROBLEM SELECTOR PLAN 7
Pt reports hx of diabetes, complicated with diabetic neuropathy  Uses trulicity 1.5 and Basaglar 70u at home  HbA1C 7.2%, Estimated average glucose 160  Glucose significantly decreased this morning to 64, from 190s-200s prior days. Patient reports eating well, not skipping meals.   -Decrease Lantus dosing to 40u  -mISS Pt reports hx of diabetes, complicated with diabetic neuropathy  Uses trulicity 1.5 and Basaglar 70u at home  HbA1C 7.2%, Estimated average glucose 160  Glucose decreased to 60s-70s, needs further adjustment. Patient reports eating well, not skipping meals.   -Continue Lantus 35u and Lispro 2u TID   -mISS

## 2023-07-31 NOTE — PROGRESS NOTE ADULT - PROBLEM SELECTOR PLAN 4
uses Losartan 25mg PO qD, continue while inpatient Hold Losartan 25mg PO qD, given patient's acute worsening of VIRAL.

## 2023-07-31 NOTE — PROGRESS NOTE ADULT - SUBJECTIVE AND OBJECTIVE BOX
Patient is a 48y old  Male who presents with a chief complaint of right hallux osteomyelitis (30 Jul 2023 11:12)      INTERVAL HPI/ OVERNIGHT EVENTS Pt seen and examined bedside. Still refusing surgical intervention at this time.       LABS                        12.8   5.41  )-----------( 279      ( 31 Jul 2023 05:30 )             36.3     07-31    138  |  104  |  20  ----------------------------<  123<H>  4.3   |  25  |  2.25<H>    Ca    8.7      31 Jul 2023 05:30  Phos  3.8     07-31  Mg     2.0     07-31    TPro  7.9  /  Alb  3.4  /  TBili  0.4  /  DBili  x   /  AST  15  /  ALT  10  /  AlkPhos  76  07-31        ICU Vital Signs Last 24 Hrs  T(C): 37.1 (31 Jul 2023 05:32), Max: 37.1 (30 Jul 2023 21:21)  T(F): 98.8 (31 Jul 2023 05:32), Max: 98.8 (31 Jul 2023 05:32)  HR: 87 (31 Jul 2023 05:32) (82 - 90)  BP: 136/78 (31 Jul 2023 05:32) (136/78 - 166/104)  BP(mean): 117 (30 Jul 2023 16:08) (117 - 117)  ABP: --  ABP(mean): --  RR: 18 (31 Jul 2023 05:32) (16 - 18)  SpO2: 97% (31 Jul 2023 05:32) (97% - 98%)    O2 Parameters below as of 30 Jul 2023 21:21  Patient On (Oxygen Delivery Method): room air        RADIOLOGY  < from: MR Foot w/ IV Cont, Right (07.29.23 @ 09:52) >  IMPRESSION:    1.  Plantar skin wound with soft tissue tract extending to the proximal   phalanx at the great toe. Rim-enhancing fluid within the tract concerning   for infection.  2.  Diffuse soft tissue swelling as can be seen with cellulitis.  3.  Abnormal marrow signal and erosive change involving the first   proximal phalanx. Given the overlying skin wound, findings areconcerning   for osteomyelitis.  4.  First metatarsophalangeal joint effusion, nonspecific finding that   can be seen with infection in the appropriate clinical setting. If   further evaluation is warranted, aspiration can be performed.  < end of copied text >    MICROBIOLOGY  Culture - Surgical Swab (collected 30 Jul 2023 08:33)  Source: .Surgical Swab Surgical Swab  Gram Stain (30 Jul 2023 18:10):    Few-moderate White blood cells    No organisms seen      PHYSICAL EXAM  Lower Extremity Focused  Vasc: DP/PT 2/4 b/l, edema to R dorsal foot and R hallux, increased warmth to R dorsal foot  Derm: sub R hallux wound 2.5 x 1.7 x 0.5 cm granular wound with macerated border, serous drainage, positive PTB, no tracking or tunneling; no open wounds left  Neuro: protective sensation diminished b/l  MSK: Partial hallux amp R

## 2023-07-31 NOTE — CONSULT NOTE ADULT - ASSESSMENT
48M with HIV (compliant with Biktarvy- VLUD and CD4 368 last checked in Jan), DM, HTN, partial great toe amputation in Jan 2022, recent R great toe OM in April (s/p 6 week course of CTX and Daptomycin) who presents for a 1 day history of right foot pain and swelling found to have presumed OM of R first phalanx. Serum Cr elevated 2.25 (1.51) since starting vanco. Bone bx obtained by podiatry today.      Suggest:  -f/u bone bx 7/31  -f/u surgical swab 7/30  -Stop vancomycin due to VIRAL   -Stop zosyn   -Start Daptomycin 900 mg IV Q24h. Check CK  -Start Cefepime 2g IV Q8h    Team 2 will follow you.    Case d/w primary team.  Final recommendation pending attending note.    Mary Lou Hernandez, Infectious Diseases PA  Please reach out for any questions 9 am-5pm. For evenings and weekends, please call the ID physician on call.  Work cell: [phone]      48M with HIV (compliant with Biktarvy- VLUD and CD4 368 last checked in Jan), DM, HTN, partial great toe amputation in Jan 2022, recent R great toe OM in April (s/p 6 week course of CTX and Daptomycin) who presents for a 1 day history of right foot pain and swelling found to have presumed OM of R first phalanx. Serum Cr elevated 2.25 (1.51) since starting vanco. Bone bx obtained by podiatry today.      Suggest:  -f/u bone bx 7/31  -f/u surgical swab 7/30  -Stop vancomycin due to VIRAL   -Stop zosyn   -Start Daptomycin 900 mg IV Q24h. Check CK  -Start Cefepime 2g IV Q8h  -Cont Biktarvy 1 tab daily     Team 2 will follow you.    Case d/w primary team.  Final recommendation pending attending note.    Mary Lou Hernandez, Infectious Diseases PA  Please reach out for any questions 9 am-5pm. For evenings and weekends, please call the ID physician on call.  Work cell: [phone]

## 2023-07-31 NOTE — PROGRESS NOTE ADULT - PROBLEM SELECTOR PLAN 3
pt reports compliance with Biktarvy, continue while inpatient  last VL in April 2023 was undetectable    -obtain admission notes from May hospitalization to confirm CD4 count pt reports compliance with Biktarvy, continue while inpatient  last VL in April 2023 was undetectable    -Cont Biktarvy 1 tab daily

## 2023-07-31 NOTE — PROGRESS NOTE ADULT - PROBLEM SELECTOR PLAN 1
Pt presenting with a 1 day history of swelling and pain to the R toe. Reports hx of OM of R great toe (has 3 month history of open wound to the toe) and completed 6 week course of antibiotics mid June (Vanc/Cefepime). Followed with a podiatrist, who reported improvement per x-ray, however 2 weeks ago again started to have purulence/swelling and was given 5 day course of Bactrim. s/p Vanc and Zosyn in the ED - XR concerning for OM. MRI 7/29 revealed Plantar skin wound with soft tissue tract extending to the proximal phalanx at the great toe and Rim-enhancing fluid within the tract concerning for infection.     -did not meet SIRS criteria on admission  -podiatry consulted, follow up recs regarding need for Surgery per MRI findings. Continue antibiotics  -c/w Vanc and Zosyn (pseudomonas dosing given hx of DM)  - follow up [facility] for notes from May 2023 admission to gather culture data and ESR/CRP trends. Pt presenting with a 1 day history of swelling and pain to the R toe. Reports hx of OM of R great toe (has 3 month history of open wound to the toe) and completed 6 week course of antibiotics mid June (Vanc/Cefepime). Followed with a podiatrist, who reported improvement per x-ray, however 2 weeks ago again started to have purulence/swelling and was given 5 day course of Bactrim. s/p Vanc and Zosyn in the ED - XR concerning for OM. MRI 7/29 revealed Plantar skin wound with soft tissue tract extending to the proximal phalanx at the great toe and Rim-enhancing fluid within the tract concerning for infection. Per MRI findings, podiatry recommended surgery however patient strongly rejects surgical intervention and wants medical optimization only. ID will be consulted for recommendations for medical treatment.      -did not meet SIRS criteria on admission  -dc Vanc and Zosyn due to VIRAL as below  -Start Daptomycin 900 mg IV Q24h. Check CK  -Start Cefepime 2g IV Q8h Pt presenting with a 1 day history of swelling and pain to the R toe. Reports hx of OM of R great toe (has 3 month history of open wound to the toe) and completed 6 week course of antibiotics mid June (Vanc/Cefepime). Followed with a podiatrist, who reported improvement per x-ray, however 2 weeks ago again started to have purulence/swelling and was given 5 day course of Bactrim. s/p Vanc and Zosyn in the ED - XR concerning for OM. MRI 7/29 revealed Plantar skin wound with soft tissue tract extending to the proximal phalanx at the great toe and Rim-enhancing fluid within the tract concerning for infection. Per MRI findings, podiatry recommended surgery however patient strongly rejects surgical intervention and wants medical optimization only. ID will be consulted for recommendations for medical treatment.      -did not meet SIRS criteria on admission  -dc Vanc and Zosyn due to VIRAL as below  -Start Daptomycin 900 mg IV Q24h. Check CK, per ID recs  -Start Cefepime 2g IV Q8h, per ID recs

## 2023-08-01 LAB
-  AMPICILLIN: SIGNIFICANT CHANGE UP
-  AZTREONAM: SIGNIFICANT CHANGE UP
-  CEFEPIME: SIGNIFICANT CHANGE UP
-  CIPROFLOXACIN: SIGNIFICANT CHANGE UP
-  LEVOFLOXACIN: SIGNIFICANT CHANGE UP
-  PIPERACILLIN/TAZOBACTAM: SIGNIFICANT CHANGE UP
-  TOBRAMYCIN: SIGNIFICANT CHANGE UP
-  VANCOMYCIN: SIGNIFICANT CHANGE UP
ANION GAP SERPL CALC-SCNC: 11 MMOL/L — SIGNIFICANT CHANGE UP (ref 5–17)
BASOPHILS # BLD AUTO: 0.07 K/UL — SIGNIFICANT CHANGE UP (ref 0–0.2)
BASOPHILS NFR BLD AUTO: 1.3 % — SIGNIFICANT CHANGE UP (ref 0–2)
BUN SERPL-MCNC: 24 MG/DL — HIGH (ref 7–23)
CALCIUM SERPL-MCNC: 8.4 MG/DL — SIGNIFICANT CHANGE UP (ref 8.4–10.5)
CHLORIDE SERPL-SCNC: 107 MMOL/L — SIGNIFICANT CHANGE UP (ref 96–108)
CK SERPL-CCNC: 133 U/L — SIGNIFICANT CHANGE UP (ref 30–200)
CO2 SERPL-SCNC: 22 MMOL/L — SIGNIFICANT CHANGE UP (ref 22–31)
CREAT SERPL-MCNC: 2.4 MG/DL — HIGH (ref 0.5–1.3)
EGFR: 32 ML/MIN/1.73M2 — LOW
EOSINOPHIL # BLD AUTO: 0.15 K/UL — SIGNIFICANT CHANGE UP (ref 0–0.5)
EOSINOPHIL NFR BLD AUTO: 2.7 % — SIGNIFICANT CHANGE UP (ref 0–6)
GLUCOSE BLDC GLUCOMTR-MCNC: 114 MG/DL — HIGH (ref 70–99)
GLUCOSE BLDC GLUCOMTR-MCNC: 132 MG/DL — HIGH (ref 70–99)
GLUCOSE BLDC GLUCOMTR-MCNC: 152 MG/DL — HIGH (ref 70–99)
GLUCOSE BLDC GLUCOMTR-MCNC: 213 MG/DL — HIGH (ref 70–99)
GLUCOSE SERPL-MCNC: 114 MG/DL — HIGH (ref 70–99)
HCT VFR BLD CALC: 35.5 % — LOW (ref 39–50)
HGB BLD-MCNC: 12.2 G/DL — LOW (ref 13–17)
IMM GRANULOCYTES NFR BLD AUTO: 0.2 % — SIGNIFICANT CHANGE UP (ref 0–0.9)
LYMPHOCYTES # BLD AUTO: 2.47 K/UL — SIGNIFICANT CHANGE UP (ref 1–3.3)
LYMPHOCYTES # BLD AUTO: 45 % — HIGH (ref 13–44)
MAGNESIUM SERPL-MCNC: 2.2 MG/DL — SIGNIFICANT CHANGE UP (ref 1.6–2.6)
MCHC RBC-ENTMCNC: 31.8 PG — SIGNIFICANT CHANGE UP (ref 27–34)
MCHC RBC-ENTMCNC: 34.4 GM/DL — SIGNIFICANT CHANGE UP (ref 32–36)
MCV RBC AUTO: 92.4 FL — SIGNIFICANT CHANGE UP (ref 80–100)
METHOD TYPE: SIGNIFICANT CHANGE UP
METHOD TYPE: SIGNIFICANT CHANGE UP
MONOCYTES # BLD AUTO: 0.44 K/UL — SIGNIFICANT CHANGE UP (ref 0–0.9)
MONOCYTES NFR BLD AUTO: 8 % — SIGNIFICANT CHANGE UP (ref 2–14)
NEUTROPHILS # BLD AUTO: 2.35 K/UL — SIGNIFICANT CHANGE UP (ref 1.8–7.4)
NEUTROPHILS NFR BLD AUTO: 42.8 % — LOW (ref 43–77)
NRBC # BLD: 0 /100 WBCS — SIGNIFICANT CHANGE UP (ref 0–0)
PHOSPHATE SERPL-MCNC: 4 MG/DL — SIGNIFICANT CHANGE UP (ref 2.5–4.5)
PLATELET # BLD AUTO: 251 K/UL — SIGNIFICANT CHANGE UP (ref 150–400)
POTASSIUM SERPL-MCNC: 4.1 MMOL/L — SIGNIFICANT CHANGE UP (ref 3.5–5.3)
POTASSIUM SERPL-SCNC: 4.1 MMOL/L — SIGNIFICANT CHANGE UP (ref 3.5–5.3)
RBC # BLD: 3.84 M/UL — LOW (ref 4.2–5.8)
RBC # FLD: 11.8 % — SIGNIFICANT CHANGE UP (ref 10.3–14.5)
SODIUM SERPL-SCNC: 140 MMOL/L — SIGNIFICANT CHANGE UP (ref 135–145)
SURGICAL PATHOLOGY STUDY: SIGNIFICANT CHANGE UP
VANCOMYCIN TROUGH SERPL-MCNC: 13.5 UG/ML — SIGNIFICANT CHANGE UP (ref 10–20)
WBC # BLD: 5.49 K/UL — SIGNIFICANT CHANGE UP (ref 3.8–10.5)
WBC # FLD AUTO: 5.49 K/UL — SIGNIFICANT CHANGE UP (ref 3.8–10.5)

## 2023-08-01 PROCEDURE — 99232 SBSQ HOSP IP/OBS MODERATE 35: CPT

## 2023-08-01 PROCEDURE — 99232 SBSQ HOSP IP/OBS MODERATE 35: CPT | Mod: GC

## 2023-08-01 RX ORDER — CEFEPIME 1 G/1
2000 INJECTION, POWDER, FOR SOLUTION INTRAMUSCULAR; INTRAVENOUS EVERY 12 HOURS
Refills: 0 | Status: DISCONTINUED | OUTPATIENT
Start: 2023-08-01 | End: 2023-08-02

## 2023-08-01 RX ADMIN — Medication 4: at 22:43

## 2023-08-01 RX ADMIN — CEFEPIME 100 MILLIGRAM(S): 1 INJECTION, POWDER, FOR SOLUTION INTRAMUSCULAR; INTRAVENOUS at 18:36

## 2023-08-01 RX ADMIN — BICTEGRAVIR SODIUM, EMTRICITABINE, AND TENOFOVIR ALAFENAMIDE FUMARATE 1 TABLET(S): 30; 120; 15 TABLET ORAL at 09:53

## 2023-08-01 RX ADMIN — Medication 2 UNIT(S): at 09:52

## 2023-08-01 RX ADMIN — Medication 81 MILLIGRAM(S): at 13:24

## 2023-08-01 RX ADMIN — CEFEPIME 100 MILLIGRAM(S): 1 INJECTION, POWDER, FOR SOLUTION INTRAMUSCULAR; INTRAVENOUS at 06:08

## 2023-08-01 RX ADMIN — Medication 2: at 13:25

## 2023-08-01 RX ADMIN — DAPTOMYCIN 136 MILLIGRAM(S): 500 INJECTION, POWDER, LYOPHILIZED, FOR SOLUTION INTRAVENOUS at 17:28

## 2023-08-01 RX ADMIN — INSULIN GLARGINE 35 UNIT(S): 100 INJECTION, SOLUTION SUBCUTANEOUS at 22:44

## 2023-08-01 NOTE — PROGRESS NOTE ADULT - ATTENDING COMMENTS
Patient was seen and examined at bedside on 7/29/2023 at 930 am. Patient reports that he feels well overall. No pain at site of foot. Denies CP, abdominal pain. ROS is otherwise negative. Vitals, labwork and pertinent imaging reviewed. Physical exam - NAD, AAO x 4, PERRLA, EOMI, supple neck, chest - CTA b/l, CV - rrr, s1s2, no m/r/g, abd - soft, NTND, + BS, ext - wwp, RLE wrapped, psych - normal affect, skin - no rash, back - midline    Plan  -Can c/w broad empiric coverage for now  -Podiatry consulted, if no surgical intervention   -F/u MRI  -Cr continues to improve  -Morning BMP w/ relatively low BG - will lower Lantus to 40 units qHS  -Need to obtain collateral regarding baseline Cr. and previous culture data
Patient was seen and examined at bedside on 7/31/2023 at 930 am. Patient reports that he feels well overall. No pain at site of foot. Denies CP, abdominal pain. ROS is otherwise negative. Vitals, labwork and pertinent imaging reviewed. Physical exam - NAD, AAO x 4, PERRLA, EOMI, supple neck, chest - CTA b/l, CV - rrr, s1s2, no m/r/g, abd - soft, NTND, + BS, ext - wwp, RLE wrapped, psych - normal affect, skin - no rash, back - midline    Plan  -Can c/w broad empiric coverage for now, will start Daptomycin and Cefepime  -Podiatry performed I/D and sent cultures  -Given uptrending Cr - check U/A, urine lytes  -FS are currently stable, c/w Lantus 35 qHS, 2 pre meal
Patient was seen and examined at bedside on 8/1/2023 at 930 am. Patient reports that he feels well overall. No pain at site of foot. Denies CP, abdominal pain. ROS is otherwise negative. Vitals, labwork and pertinent imaging reviewed. Physical exam - NAD, AAO x 4, PERRLA, EOMI, supple neck, chest - CTA b/l, CV - rrr, s1s2, no m/r/g, abd - soft, NTND, + BS, ext - wwp, RLE wrapped, psych - normal affect, skin - no rash, back - midline    Plan  -Can c/w broad empiric coverage for now, c/w Daptomycin and Cefepime  -Podiatry performed I/D and sent cultures  -Given uptrending Cr - check U/A, urine lytes  -FS are currently stable, c/w Lantus 35 qHS, 2 pre meal for breakfast and lunch, and 4 for dinner
Patient was seen and examined at bedside on 7/28/2023 at 130 pm. Patient reports that he feels well overall. No pain at site of foot. Denies CP, abdominal pain. ROS is otherwise negative. Vitals, labwork and pertinent imaging reviewed. Physical exam - NAD, AAO x 4, PERRLA, EOMI, supple neck, chest - CTA b/l, CV - rrr, s1s2, no m/r/g, abd - soft, NTND, + BS, ext - wwp, RLE wrapped, psych - normal affect, skin - no rash, back - midline    Plan  -Can c/w broad empiric coverage for now  -Podiatry consult   -Cr continues to improve

## 2023-08-01 NOTE — DIETITIAN INITIAL EVALUATION ADULT - PROBLEM SELECTOR PLAN 3
pt reports compliance with Biktarvy, continue while inpatient  states his VL and CD4 count was last checked 3 weeks ago, patient will check for outpatient labs on the portal

## 2023-08-01 NOTE — PROGRESS NOTE ADULT - PROBLEM SELECTOR PLAN 1
Pt presenting with a 1 day history of swelling and pain to the R toe. Reports hx of OM of R great toe (has 3 month history of open wound to the toe) and completed 6 week course of antibiotics mid June (Vanc/Cefepime). Followed with a podiatrist, who reported improvement per x-ray, however 2 weeks ago again started to have purulence/swelling and was given 5 day course of Bactrim. s/p Vanc and Zosyn in the ED - XR concerning for OM. MRI 7/29 revealed Plantar skin wound with soft tissue tract extending to the proximal phalanx at the great toe and Rim-enhancing fluid within the tract concerning for infection. Per MRI findings, podiatry recommended surgery however patient strongly rejects surgical intervention and wants medical optimization only. ID will be consulted for recommendations for medical treatment.      -Surgical and tissue cultures (7/30): few-moderate WBCs, no organisms  -did not meet SIRS criteria on admission  -dc Vanc and Zosyn 7/31 due to VIRAL as below  -Day 2 Daptomycin 900 mg IV Q24h. Check CK, per ID recs  -Day 2 Cefepime 2g IV Q8h, per ID recs Pt presenting with a 1 day history of swelling and pain to the R toe. Reports hx of OM of R great toe (has 3 month history of open wound to the toe) and completed 6 week course of antibiotics mid June (Vanc/Cefepime). Followed with a podiatrist, who reported improvement per x-ray, however 2 weeks ago again started to have purulence/swelling and was given 5 day course of Bactrim. s/p Vanc and Zosyn in the ED - XR concerning for OM. MRI 7/29 revealed Plantar skin wound with soft tissue tract extending to the proximal phalanx at the great toe and Rim-enhancing fluid within the tract concerning for infection. Per MRI findings, podiatry recommended surgery however patient strongly rejects surgical intervention and wants medical optimization only. ID will be consulted for recommendations for medical treatment.      -tissue cultures (7/30): few-moderate WBCs, no organisms  -surgical cultures (7/30): Pseudomonas, Sensitivity=4 to Cefepime  -did not meet SIRS criteria on admission  -dc Vanc and Zosyn 7/31 due to VIRAL as below  -Day 2 Daptomycin 900 mg IV Q24h. Check CK.  -Day 2 Switch Cefepime 2g IV Q8h to Q12h  -Follow up ID recs Pt presenting with a 1 day history of swelling and pain to the R toe. Reports hx of OM of R great toe (has 3 month history of open wound to the toe) and completed 6 week course of antibiotics mid June (Vanc/Cefepime). Followed with a podiatrist, who reported improvement per x-ray, however 2 weeks ago again started to have purulence/swelling and was given 5 day course of Bactrim. s/p Vanc and Zosyn in the ED - XR concerning for OM. MRI 7/29 revealed Plantar skin wound with soft tissue tract extending to the proximal phalanx at the great toe and Rim-enhancing fluid within the tract concerning for infection. Per MRI findings, podiatry recommended surgery however patient strongly rejects surgical intervention and wants medical optimization only. ID will be consulted for recommendations for medical treatment.      -tissue cultures (7/30): few-moderate WBCs, no organisms  -surgical cultures (7/30): Pseudomonas, Sensitivity=4 to Cefepime  -did not meet SIRS criteria on admission  -dc Vanc and Zosyn 7/31 due to VIRAL as below  -Day 2 Daptomycin 900 mg IV Q24h. Check CK.  -Day 2 Switch frequency Cefepime 2g IV Q8h to Q12h  -Follow up ID recs Pt presenting with a 1 day history of swelling and pain to the R toe. Reports hx of OM of R great toe (has 3 month history of open wound to the toe) and completed 6 week course of antibiotics mid June (Vanc/Cefepime). Followed with a podiatrist, who reported improvement per x-ray, however 2 weeks ago again started to have purulence/swelling and was given 5 day course of Bactrim. s/p Vanc and Zosyn in the ED - XR concerning for OM. MRI 7/29 revealed Plantar skin wound with soft tissue tract extending to the proximal phalanx at the great toe and Rim-enhancing fluid within the tract concerning for infection. Per MRI findings, podiatry recommended surgery however patient strongly rejects surgical intervention and wants medical optimization only. ID will be consulted for recommendations for medical treatment.      -tissue cultures (7/30): rare corynebacteium striatum   -Pseudomonas, Sensitivity=4 to Cefepime  -bone biopsy (7/31): Reactive bone and small fragments of necrotic bone with reactive  fibrous tissue, No acute osteomyelitis identified  -did not meet SIRS criteria on admission  -dc Vanc and Zosyn 7/31 due to VIRAL as below  -Day 3 Daptomycin 900 mg IV Q24h. Check CK.  -Day 3 Cefepime 2g IV Q12h  -Follow up ID recs Pt presenting with a 1 day history of swelling and pain to the R toe. Reports hx of OM of R great toe (has 3 month history of open wound to the toe) and completed 6 week course of antibiotics mid June (Vanc/Cefepime). Followed with a podiatrist, who reported improvement per x-ray, however 2 weeks ago again started to have purulence/swelling and was given 5 day course of Bactrim. s/p Vanc and Zosyn in the ED - XR concerning for OM. MRI 7/29 revealed Plantar skin wound with soft tissue tract extending to the proximal phalanx at the great toe and Rim-enhancing fluid within the tract concerning for infection. Per MRI findings, podiatry recommended surgery however patient strongly rejects surgical intervention and wants medical optimization only. ID will be consulted for recommendations for medical treatment.      -Pseudomonas, Sensitivity=4 to Cefepime  -f/u tissue cultures and bone biopsy (7/31)  -did not meet SIRS criteria on admission  -dc Vanc and Zosyn 7/31 due to VIRAL as below  -Day 2 Daptomycin 900 mg IV Q24h. Check CK.  -Day 2 Cefepime 2g IV Q12h  -Follow up ID recs

## 2023-08-01 NOTE — DIETITIAN INITIAL EVALUATION ADULT - REASON
97.5 Patient reports no weight loss or decline in intake and appeared very well nourished upon visual exam

## 2023-08-01 NOTE — PROGRESS NOTE ADULT - ASSESSMENT
48M with HIV (compliant with Biktarvy- VLUD and CD4 368 last checked in Jan), DM, HTN, partial great toe amputation in Jan 2022, recent R great toe OM in April (s/p 6 week course of CTX and Daptomycin) who presents for a 1 day history of right foot pain and swelling found to have presumed OM of R first phalanx. Serum Cr elevated 2.40 (2.26). Surgical swab cx growing PsA, E. faecalis, Enterococcus cloacae complex. Bone bx obtained by podiatry 7/31. (149)    Suggest:  -f/u bone bx 7/31  -f/u surgical swab 7/30  -Cont Daptomycin 900 mg IV Q24h   -Cont Cefepime 2g IV Q8h  -Cont Biktarvy 1 tab daily     Team 2 will follow you.    Case d/w primary team.  Final recommendation pending attending note.    Mary Lou Hernandez, Infectious Diseases PA  Please reach out for any questions 9 am-5pm. For evenings and weekends, please call the ID physician on call.  Work cell: [phone]

## 2023-08-01 NOTE — DIETITIAN INITIAL EVALUATION ADULT - PROBLEM SELECTOR PLAN 2
Pt reports hx of diabetes, complicated with diabetic neuropathy  -uses trulicity 1.5 and Basaglar 70u at home  -f/u A1c in AM (pt does not remember when it was last checked)  -will start Lantus dosing at 50u and increase as needed  -mISS

## 2023-08-01 NOTE — PROGRESS NOTE ADULT - PROBLEM SELECTOR PLAN 7
Pt reports hx of diabetes, complicated with diabetic neuropathy  Uses trulicity 1.5 and Basaglar 70u at home  HbA1C 7.2%, Estimated average glucose 160  Glucose decreased to 60s-70s, needs further adjustment. Patient reports eating well, not skipping meals.   -Continue Lantus 35u and Lispro 2u TID   -mISS Pt reports hx of diabetes, complicated with diabetic neuropathy  Uses trulicity 1.5 and Basaglar 70u at home  HbA1C 7.2%, Estimated average glucose 160  Patient reports eating well, not skipping meals.   Patient's 9/10 PM glucose high 230, 181, 227. Patient usually eats dinner 7:30/8PM.  -Continue Lantus 35u  -Continue Lispro 2u BID (pre-breakfast, pre-lunch)  -Increase Lispro 4u pre-dinner   -mISS Pt reports hx of diabetes, complicated with diabetic neuropathy  Uses trulicity 1.5 and Basaglar 70u at home  HbA1C 7.2%, Estimated average glucose 160  Patient reports eating well, not skipping meals.   -8/1: Patient's 9/10 PM glucose high on previous several nights 230, 181, 227. Patient usually eats dinner 7:30/8PM. Increased Lispro 4u pre-dinner.  -Continue Lantus 35u  -Continue Lispro 2u BID (pre-breakfast, pre-lunch)  -Continue Lispro 4u pre-dinner   -mISS Pt reports hx of diabetes, complicated with diabetic neuropathy  Uses trulicity 1.5 and Basaglar 70u at home  HbA1C 7.2%, Estimated average glucose 160  Patient reports eating well, not skipping meals.   -Patient's 9/10 PM glucose high on previous several nights 230, 181, 227. Patient usually eats dinner 7:30/8PM. Increased Lispro from 2u to 4u pre-dinner.  -Continue Lantus 35u  -Continue Lispro 2u BID (pre-breakfast, pre-lunch)  -Continue Lispro 4u pre-dinner   -mISS

## 2023-08-01 NOTE — PROGRESS NOTE ADULT - ASSESSMENT
49 y/o M with pmh HIV, DM, HTN, Partial R hallux amp(1/2022) admitted for R toe wound previously found to have OM & treated with 6 weeks IV abx.  XR with osteomyelitis of 1st proximal phalanx head. At time of consult, VSS, WBC 5.64, ESR 56, CRP 44.6. MRI with findings consistent of OM of the 1st ray proximal phalanx. High suspicion for Osteomyelitis of R hallux. S/p bone biopsy on 7/31.     Plan:     - Follow up bone biopsy cx/path  - F/u ID reccs  - C/w IV antibiotics   - Local wound care: Cleansed with NS. Applied betadine soaked gauze, kerlix with tape.   - F/u wound cx  - Rest of care per primary team    Plan d/w Attending. Podiatry following.

## 2023-08-01 NOTE — PROGRESS NOTE ADULT - SUBJECTIVE AND OBJECTIVE BOX
INFECTIOUS DISEASES CONSULT FOLLOW-UP NOTE    INTERVAL HPI/OVERNIGHT EVENTS:    Patient seen and examined at bedside. KAYLEE. Afebrile. Denies complaints today.     ROS:   Constitutional, eyes, ENT, cardiovascular, respiratory, gastrointestinal, genitourinary, integumentary, neurological, psychiatric and heme/lymph are otherwise negative other than noted above       ANTIBIOTICS/RELEVANT:    MEDICATIONS  (STANDING):  aspirin  chewable 81 milliGRAM(s) Oral every 24 hours  bictegravir 50 mG/emtricitabine 200 mG/tenofovir alafenamide 25 mG (BIKTARVY) 1 Tablet(s) Oral every 24 hours  cefepime   IVPB 2000 milliGRAM(s) IV Intermittent every 12 hours  DAPTOmycin IVPB 900 milliGRAM(s) IV Intermittent every 24 hours  dextrose 5%. 1000 milliLiter(s) (100 mL/Hr) IV Continuous <Continuous>  dextrose 5%. 1000 milliLiter(s) (50 mL/Hr) IV Continuous <Continuous>  dextrose 50% Injectable 25 Gram(s) IV Push once  dextrose 50% Injectable 12.5 Gram(s) IV Push once  dextrose 50% Injectable 25 Gram(s) IV Push once  glucagon  Injectable 1 milliGRAM(s) IntraMuscular once  heparin   Injectable 5000 Unit(s) SubCutaneous every 8 hours  insulin glargine Injectable (LANTUS) 35 Unit(s) SubCutaneous at bedtime  insulin lispro (ADMELOG) corrective regimen sliding scale   SubCutaneous Before meals and at bedtime  insulin lispro Injectable (ADMELOG) 2 Unit(s) SubCutaneous three times a day before meals  polyethylene glycol 3350 17 Gram(s) Oral two times a day    MEDICATIONS  (PRN):  acetaminophen     Tablet .. 650 milliGRAM(s) Oral every 6 hours PRN Mild Pain (1 - 3)  dextrose Oral Gel 15 Gram(s) Oral once PRN Blood Glucose LESS THAN 70 milliGRAM(s)/deciliter        Vital Signs Last 24 Hrs  T(C): 36.7 (01 Aug 2023 08:55), Max: 36.8 (01 Aug 2023 06:23)  T(F): 98.1 (01 Aug 2023 08:55), Max: 98.3 (01 Aug 2023 06:23)  HR: 80 (01 Aug 2023 08:55) (80 - 87)  BP: 136/86 (01 Aug 2023 08:55) (126/83 - 154/96)  BP(mean): --  RR: 17 (01 Aug 2023 08:55) (17 - 19)  SpO2: 98% (01 Aug 2023 08:55) (95% - 100%)    Parameters below as of 01 Aug 2023 08:55  Patient On (Oxygen Delivery Method): room air        PHYSICAL EXAM:  Constitutional: alert, NAD  Eyes: the sclera and conjunctiva were normal.   ENT: the ears and nose were normal in appearance.   Neck: the appearance of the neck was normal and the neck was supple.   Pulmonary: no respiratory distress and lungs were clear to auscultation bilaterally.   Heart: heart rate was normal and rhythm regular, normal S1 and S2  Vascular:. there was no peripheral edema  Abdomen: normal bowel sounds, soft, non-tender  Extremities: R foot wrapped   Neurological: no focal deficits.   Psychiatric: the affect was normal        LABS:                        12.2   5.49  )-----------( 251      ( 01 Aug 2023 05:30 )             35.5     08-01    140  |  107  |  24<H>  ----------------------------<  114<H>  4.1   |  22  |  2.40<H>    Ca    8.4      01 Aug 2023 05:30  Phos  4.0     08-01  Mg     2.2     08-01    TPro  7.9  /  Alb  3.4  /  TBili  0.4  /  DBili  x   /  AST  15  /  ALT  10  /  AlkPhos  76  07-31      Urinalysis Basic - ( 01 Aug 2023 05:30 )    Color: x / Appearance: x / SG: x / pH: x  Gluc: 114 mg/dL / Ketone: x  / Bili: x / Urobili: x   Blood: x / Protein: x / Nitrite: x   Leuk Esterase: x / RBC: x / WBC x   Sq Epi: x / Non Sq Epi: x / Bacteria: x        MICROBIOLOGY:  reviewed     RADIOLOGY & ADDITIONAL STUDIES:  Reviewed

## 2023-08-01 NOTE — PROGRESS NOTE ADULT - SUBJECTIVE AND OBJECTIVE BOX
OVERNIGHT EVENTS: No acute events overnight.     SUBJECTIVE / INTERVAL HPI: Patient seen and examined at bedside - no complaints. He denies any foot pain, shortness of breath, abdominal pain.    Vital Signs Last 24 Hrs  T(C): 36.8 (01 Aug 2023 06:23), Max: 36.8 (01 Aug 2023 06:23)  T(F): 98.3 (01 Aug 2023 06:23), Max: 98.3 (01 Aug 2023 06:23)  HR: 83 (01 Aug 2023 06:23) (81 - 87)  BP: 126/83 (01 Aug 2023 06:23) (126/83 - 154/96)  BP(mean): --  ABP: --  ABP(mean): --  RR: 19 (01 Aug 2023 06:23) (16 - 19)  SpO2: 100% (01 Aug 2023 06:23) (95% - 100%)    O2 Parameters below as of 01 Aug 2023 06:23  Patient On (Oxygen Delivery Method): room air          PHYSICAL EXAM:    General: NAD  HEENT: NC/AT; anicteric sclera; MMM  Neck: supple w/o palpable nodularity  Cardiovascular: +S1/S2; RRR  Respiratory: CTA B/L; no W/R/R  Gastrointestinal: soft, NT/ND; +BS x4 quadrants  Extremities: R distal great toe s/p amputation. R LE wrapped, unable to visualize ulcer. R foot warm, mild edema, no erythema. L lower extremity unremarkable.  Vascular: 2+ radial, 1+ DP/PT pulses B/L  Neurological: AAOx3; no focal deficits    MEDICATIONS:  MEDICATIONS  (STANDING):  aspirin  chewable 81 milliGRAM(s) Oral every 24 hours  bictegravir 50 mG/emtricitabine 200 mG/tenofovir alafenamide 25 mG (BIKTARVY) 1 Tablet(s) Oral every 24 hours  dextrose 5%. 1000 milliLiter(s) (50 mL/Hr) IV Continuous <Continuous>  dextrose 5%. 1000 milliLiter(s) (100 mL/Hr) IV Continuous <Continuous>  dextrose 50% Injectable 25 Gram(s) IV Push once  dextrose 50% Injectable 12.5 Gram(s) IV Push once  dextrose 50% Injectable 25 Gram(s) IV Push once  glucagon  Injectable 1 milliGRAM(s) IntraMuscular once  heparin   Injectable 5000 Unit(s) SubCutaneous every 8 hours  insulin glargine Injectable (LANTUS) 50 Unit(s) SubCutaneous at bedtime  insulin lispro (ADMELOG) corrective regimen sliding scale   SubCutaneous Before meals and at bedtime  losartan 25 milliGRAM(s) Oral every 24 hours  piperacillin/tazobactam IVPB.. 4.5 Gram(s) IV Intermittent every 8 hours  vancomycin  IVPB 1750 milliGRAM(s) IV Intermittent every 12 hours    MEDICATIONS  (PRN):  acetaminophen     Tablet .. 650 milliGRAM(s) Oral every 6 hours PRN Mild Pain (1 - 3)  dextrose Oral Gel 15 Gram(s) Oral once PRN Blood Glucose LESS THAN 70 milliGRAM(s)/deciliter      ALLERGIES:  Allergies    shellfish (Short breath)  doxycycline (Diarrhea)    Intolerances        LABS:                            12.2   5.49  )-----------( 251      ( 01 Aug 2023 05:30 )             35.5       08-01    140  |  107  |  24<H>  ----------------------------<  114<H>  4.1   |  22  |  2.40<H>    Ca    8.4      01 Aug 2023 05:30  Phos  4.0     08-01  Mg     2.2     08-01    TPro  7.9  /  Alb  3.4  /  TBili  0.4  /  DBili  x   /  AST  15  /  ALT  10  /  AlkPhos  76  07-31      CAPILLARY BLOOD GLUCOSE      POCT Blood Glucose.: 230 mg/dL (31 Jul 2023 21:56)  POCT Blood Glucose.: 162 mg/dL (31 Jul 2023 18:06)  POCT Blood Glucose.: 131 mg/dL (31 Jul 2023 12:25)  POCT Blood Glucose.: 110 mg/dL (31 Jul 2023 08:36)      RADIOLOGY & ADDITIONAL TESTS:   MR FOOT WITH IV CONTRAST RIGHT dated 7/29/2023 9:52 AM    INDICATION: Pain and swelling at the big toe    IMPRESSION:    1.  Plantar skin wound with soft tissue tract extending to the proximal   phalanx at the great toe. Rim-enhancing fluid within the tract concerning   for infection.  2.  Diffuse soft tissue swelling as can be seen with cellulitis.  3.  Abnormal marrow signal and erosive change involving the first   proximal phalanx. Given the overlying skin wound, findings are concerning   for osteomyelitis.  4.  First metatarsophalangeal joint effusion, nonspecific finding that   can be seen with infection in the appropriate clinical setting. If   further evaluation is warranted, aspiration can be performed.     OVERNIGHT EVENTS: No acute events overnight.     SUBJECTIVE / INTERVAL HPI: Patient seen and examined at bedside - no complaints. He denies any foot pain, shortness of breath, abdominal pain. He is eating and drinking appropriately.    Vital Signs Last 24 Hrs  T(C): 36.8 (01 Aug 2023 06:23), Max: 36.8 (01 Aug 2023 06:23)  T(F): 98.3 (01 Aug 2023 06:23), Max: 98.3 (01 Aug 2023 06:23)  HR: 83 (01 Aug 2023 06:23) (81 - 87)  BP: 126/83 (01 Aug 2023 06:23) (126/83 - 154/96)  BP(mean): --  ABP: --  ABP(mean): --  RR: 19 (01 Aug 2023 06:23) (16 - 19)  SpO2: 100% (01 Aug 2023 06:23) (95% - 100%)    O2 Parameters below as of 01 Aug 2023 06:23  Patient On (Oxygen Delivery Method): room air          PHYSICAL EXAM:    General: NAD  HEENT: NC/AT; anicteric sclera; MMM  Neck: supple w/o palpable nodularity  Cardiovascular: +S1/S2; RRR  Respiratory: CTA B/L; no W/R/R  Gastrointestinal: soft, NT/ND; +BS x4 quadrants  Extremities: R distal great toe s/p amputation. R LE wrapped, unable to visualize ulcer. R foot warm, mild edema, no erythema. L lower extremity unremarkable.  Vascular: 2+ radial, 1+ DP/PT pulses B/L  Neurological: AAOx3; no focal deficits    MEDICATIONS:  MEDICATIONS  (STANDING):  aspirin  chewable 81 milliGRAM(s) Oral every 24 hours  bictegravir 50 mG/emtricitabine 200 mG/tenofovir alafenamide 25 mG (BIKTARVY) 1 Tablet(s) Oral every 24 hours  dextrose 5%. 1000 milliLiter(s) (50 mL/Hr) IV Continuous <Continuous>  dextrose 5%. 1000 milliLiter(s) (100 mL/Hr) IV Continuous <Continuous>  dextrose 50% Injectable 25 Gram(s) IV Push once  dextrose 50% Injectable 12.5 Gram(s) IV Push once  dextrose 50% Injectable 25 Gram(s) IV Push once  glucagon  Injectable 1 milliGRAM(s) IntraMuscular once  heparin   Injectable 5000 Unit(s) SubCutaneous every 8 hours  insulin glargine Injectable (LANTUS) 50 Unit(s) SubCutaneous at bedtime  insulin lispro (ADMELOG) corrective regimen sliding scale   SubCutaneous Before meals and at bedtime  losartan 25 milliGRAM(s) Oral every 24 hours  piperacillin/tazobactam IVPB.. 4.5 Gram(s) IV Intermittent every 8 hours  vancomycin  IVPB 1750 milliGRAM(s) IV Intermittent every 12 hours    MEDICATIONS  (PRN):  acetaminophen     Tablet .. 650 milliGRAM(s) Oral every 6 hours PRN Mild Pain (1 - 3)  dextrose Oral Gel 15 Gram(s) Oral once PRN Blood Glucose LESS THAN 70 milliGRAM(s)/deciliter      ALLERGIES:  Allergies    shellfish (Short breath)  doxycycline (Diarrhea)    Intolerances        LABS:                            12.2   5.49  )-----------( 251      ( 01 Aug 2023 05:30 )             35.5       08-01    140  |  107  |  24<H>  ----------------------------<  114<H>  4.1   |  22  |  2.40<H>    Ca    8.4      01 Aug 2023 05:30  Phos  4.0     08-01  Mg     2.2     08-01    TPro  7.9  /  Alb  3.4  /  TBili  0.4  /  DBili  x   /  AST  15  /  ALT  10  /  AlkPhos  76  07-31      CAPILLARY BLOOD GLUCOSE      POCT Blood Glucose.: 230 mg/dL (31 Jul 2023 21:56)  POCT Blood Glucose.: 162 mg/dL (31 Jul 2023 18:06)  POCT Blood Glucose.: 131 mg/dL (31 Jul 2023 12:25)  POCT Blood Glucose.: 110 mg/dL (31 Jul 2023 08:36)      RADIOLOGY & ADDITIONAL TESTS:   MR FOOT WITH IV CONTRAST RIGHT dated 7/29/2023 9:52 AM    INDICATION: Pain and swelling at the big toe    IMPRESSION:    1.  Plantar skin wound with soft tissue tract extending to the proximal   phalanx at the great toe. Rim-enhancing fluid within the tract concerning   for infection.  2.  Diffuse soft tissue swelling as can be seen with cellulitis.  3.  Abnormal marrow signal and erosive change involving the first   proximal phalanx. Given the overlying skin wound, findings are concerning   for osteomyelitis.  4.  First metatarsophalangeal joint effusion, nonspecific finding that   can be seen with infection in the appropriate clinical setting. If   further evaluation is warranted, aspiration can be performed.     OVERNIGHT EVENTS: No acute events overnight.     SUBJECTIVE / INTERVAL HPI: Patient seen and examined at bedside - no complaints. He denies any foot pain, shortness of breath, abdominal pain. He is eating and drinking appropriately.    Vital Signs Last 24 Hrs  T(C): 37 (02 Aug 2023 05:53), Max: 37 (02 Aug 2023 05:53)  T(F): 98.6 (02 Aug 2023 05:53), Max: 98.6 (02 Aug 2023 05:53)  HR: 84 (02 Aug 2023 05:53) (80 - 84)  BP: 131/85 (02 Aug 2023 05:53) (131/85 - 158/91)  BP(mean): --  ABP: --  ABP(mean): --  RR: 19 (02 Aug 2023 05:53) (17 - 20)  SpO2: 98% (02 Aug 2023 05:53) (97% - 98%)    O2 Parameters below as of 02 Aug 2023 05:53  Patient On (Oxygen Delivery Method): room air          PHYSICAL EXAM:    General: NAD  HEENT: NC/AT; anicteric sclera; MMM  Neck: supple w/o palpable nodularity  Cardiovascular: +S1/S2; RRR  Respiratory: CTA B/L; no W/R/R  Gastrointestinal: soft, NT/ND; +BS x4 quadrants  Extremities: R distal great toe s/p amputation. R LE wrapped, unable to visualize ulcer. R foot warm, no edema, no erythema. L lower extremity unremarkable.  Vascular: 2+ radial, 1+ DP/PT pulses B/L  Neurological: AAOx3; no focal deficits    MEDICATIONS:  MEDICATIONS  (STANDING):  aspirin  chewable 81 milliGRAM(s) Oral every 24 hours  bictegravir 50 mG/emtricitabine 200 mG/tenofovir alafenamide 25 mG (BIKTARVY) 1 Tablet(s) Oral every 24 hours  dextrose 5%. 1000 milliLiter(s) (50 mL/Hr) IV Continuous <Continuous>  dextrose 5%. 1000 milliLiter(s) (100 mL/Hr) IV Continuous <Continuous>  dextrose 50% Injectable 25 Gram(s) IV Push once  dextrose 50% Injectable 12.5 Gram(s) IV Push once  dextrose 50% Injectable 25 Gram(s) IV Push once  glucagon  Injectable 1 milliGRAM(s) IntraMuscular once  heparin   Injectable 5000 Unit(s) SubCutaneous every 8 hours  insulin glargine Injectable (LANTUS) 50 Unit(s) SubCutaneous at bedtime  insulin lispro (ADMELOG) corrective regimen sliding scale   SubCutaneous Before meals and at bedtime  losartan 25 milliGRAM(s) Oral every 24 hours  piperacillin/tazobactam IVPB.. 4.5 Gram(s) IV Intermittent every 8 hours  vancomycin  IVPB 1750 milliGRAM(s) IV Intermittent every 12 hours    MEDICATIONS  (PRN):  acetaminophen     Tablet .. 650 milliGRAM(s) Oral every 6 hours PRN Mild Pain (1 - 3)  dextrose Oral Gel 15 Gram(s) Oral once PRN Blood Glucose LESS THAN 70 milliGRAM(s)/deciliter      ALLERGIES:  Allergies    shellfish (Short breath)  doxycycline (Diarrhea)    Intolerances        LABS:                            12.1   5.36  )-----------( 247      ( 02 Aug 2023 05:30 )             35.4           08-01    140  |  107  |  24<H>  ----------------------------<  114<H>  4.1   |  22  |  2.40<H>    Ca    8.4      01 Aug 2023 05:30  Phos  4.0     08-01  Mg     2.2     08-01    TPro  7.9  /  Alb  3.4  /  TBili  0.4  /  DBili  x   /  AST  15  /  ALT  10  /  AlkPhos  76  07-31      CAPILLARY BLOOD GLUCOSE      POCT Blood Glucose.: 230 mg/dL (31 Jul 2023 21:56)  POCT Blood Glucose.: 162 mg/dL (31 Jul 2023 18:06)  POCT Blood Glucose.: 131 mg/dL (31 Jul 2023 12:25)  POCT Blood Glucose.: 110 mg/dL (31 Jul 2023 08:36)      RADIOLOGY & ADDITIONAL TESTS:   MR FOOT WITH IV CONTRAST RIGHT dated 7/29/2023 9:52 AM    INDICATION: Pain and swelling at the big toe    IMPRESSION:    1.  Plantar skin wound with soft tissue tract extending to the proximal   phalanx at the great toe. Rim-enhancing fluid within the tract concerning   for infection.  2.  Diffuse soft tissue swelling as can be seen with cellulitis.  3.  Abnormal marrow signal and erosive change involving the first   proximal phalanx. Given the overlying skin wound, findings are concerning   for osteomyelitis.  4.  First metatarsophalangeal joint effusion, nonspecific finding that   can be seen with infection in the appropriate clinical setting. If   further evaluation is warranted, aspiration can be performed.     OVERNIGHT EVENTS: No acute events overnight.     SUBJECTIVE / INTERVAL HPI: Patient seen and examined at bedside - no complaints. He denies any foot pain, shortness of breath, abdominal pain. He is eating and drinking appropriately.    Vital Signs Last 24 Hrs  T 98.3  HR 83  /83  RR 19  SpO2 100 on RA      PHYSICAL EXAM:    General: NAD  HEENT: NC/AT; anicteric sclera; MMM  Neck: supple w/o palpable nodularity  Cardiovascular: +S1/S2; RRR  Respiratory: CTA B/L; no W/R/R  Gastrointestinal: soft, NT/ND; +BS x4 quadrants  Extremities: R distal great toe s/p amputation. R LE wrapped, unable to visualize ulcer. R foot warm, no edema, no erythema. L lower extremity unremarkable.  Vascular: 2+ radial, 1+ DP/PT pulses B/L  Neurological: AAOx3; no focal deficits    MEDICATIONS:  MEDICATIONS  (STANDING):  aspirin  chewable 81 milliGRAM(s) Oral every 24 hours  bictegravir 50 mG/emtricitabine 200 mG/tenofovir alafenamide 25 mG (BIKTARVY) 1 Tablet(s) Oral every 24 hours  dextrose 5%. 1000 milliLiter(s) (50 mL/Hr) IV Continuous <Continuous>  dextrose 5%. 1000 milliLiter(s) (100 mL/Hr) IV Continuous <Continuous>  dextrose 50% Injectable 25 Gram(s) IV Push once  dextrose 50% Injectable 12.5 Gram(s) IV Push once  dextrose 50% Injectable 25 Gram(s) IV Push once  glucagon  Injectable 1 milliGRAM(s) IntraMuscular once  heparin   Injectable 5000 Unit(s) SubCutaneous every 8 hours  insulin glargine Injectable (LANTUS) 50 Unit(s) SubCutaneous at bedtime  insulin lispro (ADMELOG) corrective regimen sliding scale   SubCutaneous Before meals and at bedtime  losartan 25 milliGRAM(s) Oral every 24 hours  piperacillin/tazobactam IVPB.. 4.5 Gram(s) IV Intermittent every 8 hours  vancomycin  IVPB 1750 milliGRAM(s) IV Intermittent every 12 hours    MEDICATIONS  (PRN):  acetaminophen     Tablet .. 650 milliGRAM(s) Oral every 6 hours PRN Mild Pain (1 - 3)  dextrose Oral Gel 15 Gram(s) Oral once PRN Blood Glucose LESS THAN 70 milliGRAM(s)/deciliter      ALLERGIES:  Allergies    shellfish (Short breath)  doxycycline (Diarrhea)    Intolerances        LABS:                            12.2   5.49  )-----------( 247      ( 01 Aug 2023 05:30 )             35.5           08-01    140  |  107  |  24<H>  ----------------------------<  114<H>  4.1   |  22  |  2.40<H>    Ca    8.4      01 Aug 2023 05:30  Phos  4.0     08-01  Mg     2.2     08-01    TPro  7.9  /  Alb  3.4  /  TBili  0.4  /  DBili  x   /  AST  15  /  ALT  10  /  AlkPhos  76  07-31      CAPILLARY BLOOD GLUCOSE      POCT Blood Glucose.: 230 mg/dL (31 Jul 2023 21:56)  POCT Blood Glucose.: 162 mg/dL (31 Jul 2023 18:06)  POCT Blood Glucose.: 131 mg/dL (31 Jul 2023 12:25)  POCT Blood Glucose.: 110 mg/dL (31 Jul 2023 08:36)      RADIOLOGY & ADDITIONAL TESTS:   MR FOOT WITH IV CONTRAST RIGHT dated 7/29/2023 9:52 AM    INDICATION: Pain and swelling at the big toe    IMPRESSION:    1.  Plantar skin wound with soft tissue tract extending to the proximal   phalanx at the great toe. Rim-enhancing fluid within the tract concerning   for infection.  2.  Diffuse soft tissue swelling as can be seen with cellulitis.  3.  Abnormal marrow signal and erosive change involving the first   proximal phalanx. Given the overlying skin wound, findings are concerning   for osteomyelitis.  4.  First metatarsophalangeal joint effusion, nonspecific finding that   can be seen with infection in the appropriate clinical setting. If   further evaluation is warranted, aspiration can be performed.

## 2023-08-01 NOTE — DIETITIAN INITIAL EVALUATION ADULT - PROBLEM SELECTOR PLAN 1
Pt presenting with a 1 day history of swelling and pain to the R toe. Reports hx of OM of R great toe (has 3 month history of open wound to the toe) and completed 6 week course of antibiotics mid June (Vanc/Cefepime). Followed with a podiatrist, who reported improvement per x-ray, however 2 weeks ago again started to have purulence/swelling and was given 5 day course of Bactrim  -does not meet SIRS criteria on admission  -s/p Vanc and Zosyn in the ED. Xray with concern for OM of proximal phalanx head  -podiatry consulted, f/u recommendations  -f/u ESR and CRP in AM  -MR with IV contrast of R foot ordered  -c/w Vanc and Zosyn (pseudomonas dosing given hx of DM)

## 2023-08-01 NOTE — DIETITIAN INITIAL EVALUATION ADULT - PERTINENT MEDS FT
MEDICATIONS  (STANDING):  aspirin  chewable 81 milliGRAM(s) Oral every 24 hours  bictegravir 50 mG/emtricitabine 200 mG/tenofovir alafenamide 25 mG (BIKTARVY) 1 Tablet(s) Oral every 24 hours  cefepime   IVPB 2000 milliGRAM(s) IV Intermittent every 12 hours  DAPTOmycin IVPB 900 milliGRAM(s) IV Intermittent every 24 hours  dextrose 5%. 1000 milliLiter(s) (100 mL/Hr) IV Continuous <Continuous>  dextrose 5%. 1000 milliLiter(s) (50 mL/Hr) IV Continuous <Continuous>  dextrose 50% Injectable 25 Gram(s) IV Push once  dextrose 50% Injectable 12.5 Gram(s) IV Push once  dextrose 50% Injectable 25 Gram(s) IV Push once  glucagon  Injectable 1 milliGRAM(s) IntraMuscular once  heparin   Injectable 5000 Unit(s) SubCutaneous every 8 hours  insulin glargine Injectable (LANTUS) 35 Unit(s) SubCutaneous at bedtime  insulin lispro (ADMELOG) corrective regimen sliding scale   SubCutaneous Before meals and at bedtime  insulin lispro Injectable (ADMELOG) 2 Unit(s) SubCutaneous three times a day before meals  polyethylene glycol 3350 17 Gram(s) Oral two times a day    MEDICATIONS  (PRN):  acetaminophen     Tablet .. 650 milliGRAM(s) Oral every 6 hours PRN Mild Pain (1 - 3)  dextrose Oral Gel 15 Gram(s) Oral once PRN Blood Glucose LESS THAN 70 milliGRAM(s)/deciliter

## 2023-08-01 NOTE — PROGRESS NOTE ADULT - PROBLEM SELECTOR PLAN 3
pt reports compliance with Biktarvy, continue while inpatient  last VL in April 2023 was undetectable    -Cont Biktarvy 1 tab daily

## 2023-08-01 NOTE — DIETITIAN INITIAL EVALUATION ADULT - ORAL INTAKE PTA/DIET HISTORY
Writer interviewed patient at bedside; Patient presented as alert and able to easily express and feed self. Patient reports no decline in intake and no weight loss prior to admission. Appears adequately nourished without s/s of wasting noted upon visual exam by writer. Intake is % since admission per patient report. No chewing or swallowing problems reported. Allergy to shellfish confirmed and reflected in diet record. Skin intact. No N/V/D/C per patient report. Preferneces reviewed; no specific requests expressd; has room service assist ot obtain preferneces daily. Usual itnake: B: muffin andfruit, Lunch: whatever he caneat due to his living with relatives as he just moved ot [place] one month ago. Dinner: salad, chicken, greens, fruit. was aware of sources of carbohydrates, expressed good understanding of principles of consistent carb diet. Education provided per [facility] diet, sources of carbs, healthy fats and lean proteins, heart healthy fats, portion sizes and emergency measures for hypoglycemia with excellent teach back by patient. Handout given to reinforce [facility] heart healthy diet for discharge. Needs as estimated are met with POC and intake at this time. Recommend continue POC; patient expressed agreement ot POC.

## 2023-08-01 NOTE — PROGRESS NOTE ADULT - SUBJECTIVE AND OBJECTIVE BOX
Patient is a 48y old  Male who presents with a chief complaint of right hallux osteomyelitis (01 Aug 2023 07:21)      INTERVAL HPI/ OVERNIGHT EVENTS Pt seen and examined bedside. States pain was controlled after bone biopsy.       LABS                        12.2   5.49  )-----------( 251      ( 01 Aug 2023 05:30 )             35.5     08-01    140  |  107  |  24<H>  ----------------------------<  114<H>  4.1   |  22  |  2.40<H>    Ca    8.4      01 Aug 2023 05:30  Phos  4.0     08-01  Mg     2.2     08-01    TPro  7.9  /  Alb  3.4  /  TBili  0.4  /  DBili  x   /  AST  15  /  ALT  10  /  AlkPhos  76  07-31        ICU Vital Signs Last 24 Hrs  T(C): 36.8 (01 Aug 2023 06:23), Max: 36.8 (01 Aug 2023 06:23)  T(F): 98.3 (01 Aug 2023 06:23), Max: 98.3 (01 Aug 2023 06:23)  HR: 83 (01 Aug 2023 06:23) (81 - 87)  BP: 126/83 (01 Aug 2023 06:23) (126/83 - 154/96)  BP(mean): --  ABP: --  ABP(mean): --  RR: 19 (01 Aug 2023 06:23) (16 - 19)  SpO2: 100% (01 Aug 2023 06:23) (95% - 100%)    O2 Parameters below as of 01 Aug 2023 06:23  Patient On (Oxygen Delivery Method): room air        RADIOLOGY  < from: MR Foot w/ IV Cont, Right (07.29.23 @ 09:52) >  IMPRESSION:    1.  Plantar skin wound with soft tissue tract extending to the proximal   phalanx at the great toe. Rim-enhancing fluid within the tract concerning   for infection.  2.  Diffuse soft tissue swelling as can be seen with cellulitis.  3.  Abnormal marrow signal and erosive change involving the first   proximal phalanx. Given the overlying skin wound, findings areconcerning   for osteomyelitis.  4.  First metatarsophalangeal joint effusion, nonspecific finding that   can be seen with infection in the appropriate clinical setting. If   further evaluation is warranted, aspiration can be performed.  < end of copied text >    MICROBIOLOGY  Culture - Tissue with Gram Stain (07.31.23 @ 10:34)    Gram Stain:   No organisms seen  No WBC's seen.   Specimen Source: .Tissue right halux      PHYSICAL EXAM  Lower Extremity Focused  Vasc: DP/PT 2/4 b/l, edema to R dorsal foot and R hallux, increased warmth to R dorsal foot  Derm: sub R hallux wound 2.5 x 1.7 x 0.5 cm granular wound with macerated border, serous drainage, positive PTB, no tracking or tunneling; no open wounds left  Neuro: protective sensation diminished b/l  MSK: Partial hallux amp R

## 2023-08-01 NOTE — PROGRESS NOTE ADULT - PROBLEM SELECTOR PLAN 2
- creatinine 1.66 on admission; Cr worsened from 2.26 to 2.4 today.   - Bladder scan showed 63 mL   - Vanc trough improved from 22.9 7/31 to 13.5 today 8/1.  - UA negative nitrites, leukocyte esterase  - serum Na 140, Serum Cr 2.4, Urine Na 42, Urine Cr 99. FeNa 0.7% suggesting pre-renal etiology.  - intrinsic (vanc and zosyn nephrotoxicity) vs. pre-renal origin due to hypotension in ED   - patient verbalized that on prior admission for OM, inpatient team was monitoring elevated CK levels while on vanc  - baseline Cr prior to hospitalization was 1.5 per PCP (Dr. Dena Ochoa) records from patient online portal - creatinine 1.66 on admission; Cr worsened from 2.26 to 2.4 today.   - Bladder scan showed 63 mL   - Vanc trough improved from 22.9 7/31 to 13.5 today 8/1.  - UA negative nitrites, leukocyte esterase  - serum Na 140, Serum Cr 2.4, Urine Na 42, Urine Cr 99. FeNa 0.7% suggesting pre-renal etiology.  - intrinsic (vanc and zosyn nephrotoxicity) vs. pre-renal origin due to hypotension in ED   - patient verbalized that on prior admission for OM, inpatient team was monitoring elevated CK levels while on vanc  - baseline Cr prior to hospitalization was 1.5 per PCP (Dr. Dena Ochoa) records from patient online portal  - encourage PO intake  - Trend Cr, f/u BMP daily

## 2023-08-01 NOTE — DIETITIAN INITIAL EVALUATION ADULT - PERTINENT LABORATORY DATA
08-01    140  |  107  |  24<H>  ----------------------------<  114<H>  4.1   |  22  |  2.40<H>    Ca    8.4      01 Aug 2023 05:30  Phos  4.0     08-01  Mg     2.2     08-01    TPro  7.9  /  Alb  3.4  /  TBili  0.4  /  DBili  x   /  AST  15  /  ALT  10  /  AlkPhos  76  07-31  POCT Blood Glucose.: 152 mg/dL (08-01-23 @ 12:33)  A1C with Estimated Average Glucose Result: 7.2 % (07-28-23 @ 05:30)

## 2023-08-02 ENCOUNTER — TRANSCRIPTION ENCOUNTER (OUTPATIENT)
Age: 48
End: 2023-08-02

## 2023-08-02 VITALS
OXYGEN SATURATION: 97 % | TEMPERATURE: 98 F | RESPIRATION RATE: 18 BRPM | HEART RATE: 79 BPM | SYSTOLIC BLOOD PRESSURE: 143 MMHG | DIASTOLIC BLOOD PRESSURE: 88 MMHG

## 2023-08-02 LAB
-  AMIKACIN: SIGNIFICANT CHANGE UP
-  AMPICILLIN/SULBACTAM: SIGNIFICANT CHANGE UP
-  AMPICILLIN: SIGNIFICANT CHANGE UP
-  AZTREONAM: SIGNIFICANT CHANGE UP
-  CEFAZOLIN: SIGNIFICANT CHANGE UP
-  CEFEPIME: SIGNIFICANT CHANGE UP
-  CEFEPIME: SIGNIFICANT CHANGE UP
-  CEFTRIAXONE: SIGNIFICANT CHANGE UP
-  CIPROFLOXACIN: SIGNIFICANT CHANGE UP
-  CIPROFLOXACIN: SIGNIFICANT CHANGE UP
-  ERTAPENEM: SIGNIFICANT CHANGE UP
-  GENTAMICIN: SIGNIFICANT CHANGE UP
-  LEVOFLOXACIN: SIGNIFICANT CHANGE UP
-  PIPERACILLIN/TAZOBACTAM: SIGNIFICANT CHANGE UP
-  PIPERACILLIN/TAZOBACTAM: SIGNIFICANT CHANGE UP
-  TOBRAMYCIN: SIGNIFICANT CHANGE UP
-  TOBRAMYCIN: SIGNIFICANT CHANGE UP
-  TRIMETHOPRIM/SULFAMETHOXAZOLE: SIGNIFICANT CHANGE UP
ANION GAP SERPL CALC-SCNC: 11 MMOL/L — SIGNIFICANT CHANGE UP (ref 5–17)
APPEARANCE UR: CLEAR — SIGNIFICANT CHANGE UP
BASOPHILS # BLD AUTO: 0.07 K/UL — SIGNIFICANT CHANGE UP (ref 0–0.2)
BASOPHILS NFR BLD AUTO: 1.3 % — SIGNIFICANT CHANGE UP (ref 0–2)
BILIRUB UR-MCNC: NEGATIVE — SIGNIFICANT CHANGE UP
BUN SERPL-MCNC: 29 MG/DL — HIGH (ref 7–23)
CALCIUM SERPL-MCNC: 9 MG/DL — SIGNIFICANT CHANGE UP (ref 8.4–10.5)
CHLORIDE SERPL-SCNC: 110 MMOL/L — HIGH (ref 96–108)
CO2 SERPL-SCNC: 22 MMOL/L — SIGNIFICANT CHANGE UP (ref 22–31)
COLOR SPEC: YELLOW — SIGNIFICANT CHANGE UP
CREAT ?TM UR-MCNC: 92 MG/DL — SIGNIFICANT CHANGE UP
CREAT SERPL-MCNC: 2.3 MG/DL — HIGH (ref 0.5–1.3)
CULTURE RESULTS: SIGNIFICANT CHANGE UP
DIFF PNL FLD: NEGATIVE — SIGNIFICANT CHANGE UP
EGFR: 34 ML/MIN/1.73M2 — LOW
EOSINOPHIL # BLD AUTO: 0.1 K/UL — SIGNIFICANT CHANGE UP (ref 0–0.5)
EOSINOPHIL NFR BLD AUTO: 1.9 % — SIGNIFICANT CHANGE UP (ref 0–6)
GLUCOSE BLDC GLUCOMTR-MCNC: 103 MG/DL — HIGH (ref 70–99)
GLUCOSE BLDC GLUCOMTR-MCNC: 132 MG/DL — HIGH (ref 70–99)
GLUCOSE BLDC GLUCOMTR-MCNC: 93 MG/DL — SIGNIFICANT CHANGE UP (ref 70–99)
GLUCOSE SERPL-MCNC: 91 MG/DL — SIGNIFICANT CHANGE UP (ref 70–99)
GLUCOSE UR QL: NEGATIVE — SIGNIFICANT CHANGE UP
HCT VFR BLD CALC: 35.4 % — LOW (ref 39–50)
HGB BLD-MCNC: 12.1 G/DL — LOW (ref 13–17)
IMM GRANULOCYTES NFR BLD AUTO: 0.2 % — SIGNIFICANT CHANGE UP (ref 0–0.9)
KETONES UR-MCNC: NEGATIVE — SIGNIFICANT CHANGE UP
LEUKOCYTE ESTERASE UR-ACNC: NEGATIVE — SIGNIFICANT CHANGE UP
LYMPHOCYTES # BLD AUTO: 2.05 K/UL — SIGNIFICANT CHANGE UP (ref 1–3.3)
LYMPHOCYTES # BLD AUTO: 38.2 % — SIGNIFICANT CHANGE UP (ref 13–44)
MAGNESIUM SERPL-MCNC: 2.2 MG/DL — SIGNIFICANT CHANGE UP (ref 1.6–2.6)
MCHC RBC-ENTMCNC: 31.3 PG — SIGNIFICANT CHANGE UP (ref 27–34)
MCHC RBC-ENTMCNC: 34.2 GM/DL — SIGNIFICANT CHANGE UP (ref 32–36)
MCV RBC AUTO: 91.5 FL — SIGNIFICANT CHANGE UP (ref 80–100)
METHOD TYPE: SIGNIFICANT CHANGE UP
MONOCYTES # BLD AUTO: 0.42 K/UL — SIGNIFICANT CHANGE UP (ref 0–0.9)
MONOCYTES NFR BLD AUTO: 7.8 % — SIGNIFICANT CHANGE UP (ref 2–14)
NEUTROPHILS # BLD AUTO: 2.71 K/UL — SIGNIFICANT CHANGE UP (ref 1.8–7.4)
NEUTROPHILS NFR BLD AUTO: 50.6 % — SIGNIFICANT CHANGE UP (ref 43–77)
NITRITE UR-MCNC: NEGATIVE — SIGNIFICANT CHANGE UP
NRBC # BLD: 0 /100 WBCS — SIGNIFICANT CHANGE UP (ref 0–0)
ORGANISM # SPEC MICROSCOPIC CNT: SIGNIFICANT CHANGE UP
OSMOLALITY UR: 373 MOSM/KG — SIGNIFICANT CHANGE UP (ref 300–900)
PH UR: 6 — SIGNIFICANT CHANGE UP (ref 5–8)
PHOSPHATE SERPL-MCNC: 3.9 MG/DL — SIGNIFICANT CHANGE UP (ref 2.5–4.5)
PLATELET # BLD AUTO: 247 K/UL — SIGNIFICANT CHANGE UP (ref 150–400)
POTASSIUM SERPL-MCNC: 4.2 MMOL/L — SIGNIFICANT CHANGE UP (ref 3.5–5.3)
POTASSIUM SERPL-SCNC: 4.2 MMOL/L — SIGNIFICANT CHANGE UP (ref 3.5–5.3)
PROT UR-MCNC: NEGATIVE MG/DL — SIGNIFICANT CHANGE UP
RBC # BLD: 3.87 M/UL — LOW (ref 4.2–5.8)
RBC # FLD: 11.6 % — SIGNIFICANT CHANGE UP (ref 10.3–14.5)
SODIUM SERPL-SCNC: 143 MMOL/L — SIGNIFICANT CHANGE UP (ref 135–145)
SODIUM UR-SCNC: 69 MMOL/L — SIGNIFICANT CHANGE UP
SP GR SPEC: 1.01 — SIGNIFICANT CHANGE UP (ref 1–1.03)
SPECIMEN SOURCE: SIGNIFICANT CHANGE UP
UROBILINOGEN FLD QL: 0.2 E.U./DL — SIGNIFICANT CHANGE UP
WBC # BLD: 5.36 K/UL — SIGNIFICANT CHANGE UP (ref 3.8–10.5)
WBC # FLD AUTO: 5.36 K/UL — SIGNIFICANT CHANGE UP (ref 3.8–10.5)

## 2023-08-02 PROCEDURE — 99239 HOSP IP/OBS DSCHRG MGMT >30: CPT | Mod: GC

## 2023-08-02 PROCEDURE — 99232 SBSQ HOSP IP/OBS MODERATE 35: CPT

## 2023-08-02 RX ORDER — CEFPODOXIME PROXETIL 100 MG
2 TABLET ORAL
Qty: 20 | Refills: 0
Start: 2023-08-02 | End: 2023-08-06

## 2023-08-02 RX ORDER — CIPROFLOXACIN LACTATE 400MG/40ML
1 VIAL (ML) INTRAVENOUS
Qty: 10 | Refills: 0
Start: 2023-08-02 | End: 2023-08-06

## 2023-08-02 RX ORDER — LOSARTAN POTASSIUM 100 MG/1
1 TABLET, FILM COATED ORAL
Refills: 0 | DISCHARGE

## 2023-08-02 RX ORDER — LINEZOLID 600 MG/300ML
1 INJECTION, SOLUTION INTRAVENOUS
Qty: 10 | Refills: 0
Start: 2023-08-02 | End: 2023-08-06

## 2023-08-02 RX ORDER — AMOXICILLIN 250 MG/5ML
1 SUSPENSION, RECONSTITUTED, ORAL (ML) ORAL
Qty: 10 | Refills: 0
Start: 2023-08-02 | End: 2023-08-06

## 2023-08-02 RX ADMIN — Medication 2 UNIT(S): at 17:34

## 2023-08-02 RX ADMIN — Medication 81 MILLIGRAM(S): at 11:25

## 2023-08-02 RX ADMIN — CEFEPIME 100 MILLIGRAM(S): 1 INJECTION, POWDER, FOR SOLUTION INTRAMUSCULAR; INTRAVENOUS at 17:34

## 2023-08-02 RX ADMIN — Medication 2 UNIT(S): at 09:22

## 2023-08-02 RX ADMIN — CEFEPIME 100 MILLIGRAM(S): 1 INJECTION, POWDER, FOR SOLUTION INTRAMUSCULAR; INTRAVENOUS at 07:11

## 2023-08-02 RX ADMIN — BICTEGRAVIR SODIUM, EMTRICITABINE, AND TENOFOVIR ALAFENAMIDE FUMARATE 1 TABLET(S): 30; 120; 15 TABLET ORAL at 09:22

## 2023-08-02 RX ADMIN — DAPTOMYCIN 136 MILLIGRAM(S): 500 INJECTION, POWDER, LYOPHILIZED, FOR SOLUTION INTRAVENOUS at 17:10

## 2023-08-02 RX ADMIN — Medication 2 UNIT(S): at 13:08

## 2023-08-02 NOTE — DISCHARGE NOTE PROVIDER - [id number]_GEN_ALL_CORE_FT
aspirin 81 mg oral capsule: 1 orally once a day  atorvastatin 20 mg oral tablet: 1 orally once a day  Biktarvy 30 mg-120 mg-15 mg oral tablet: 1 orally once a day  cefpodoxime 200 mg oral tablet: 2 tab(s) orally every 12 hours  ciprofloxacin 750 mg oral tablet: 1 tab(s) orally every 12 hours  Lantus 100 units/mL subcutaneous solution: 70 unit(s) subcutaneous once a day (at bedtime)  linezolid 600 mg oral tablet: 1 tab(s) orally every 12 hours  losartan 25 mg oral tablet: 1 orally once a day  Trulicity Pen 1.5 mg/0.5 mL subcutaneous solution: 1.5 subcutaneously once a week   aspirin 81 mg oral capsule: 1 orally once a day  atorvastatin 20 mg oral tablet: 1 orally once a day  Biktarvy 30 mg-120 mg-15 mg oral tablet: 1 orally once a day  cefpodoxime 200 mg oral tablet: 2 tab(s) orally every 12 hours  ciprofloxacin 750 mg oral tablet: 1 tab(s) orally every 12 hours  Lantus 100 units/mL subcutaneous solution: 70 unit(s) subcutaneous once a day (at bedtime)  linezolid 600 mg oral tablet: 1 tab(s) orally every 12 hours  Trulicity Pen 1.5 mg/0.5 mL subcutaneous solution: 1.5 subcutaneously once a week   amoxicillin 875 mg oral tablet: 1 tab(s) orally every 12 hours  aspirin 81 mg oral capsule: 1 orally once a day  atorvastatin 20 mg oral tablet: 1 orally once a day  Biktarvy 30 mg-120 mg-15 mg oral tablet: 1 orally once a day  cefpodoxime 200 mg oral tablet: 2 tab(s) orally every 12 hours  ciprofloxacin 750 mg oral tablet: 1 tab(s) orally every 12 hours  Lantus 100 units/mL subcutaneous solution: 70 unit(s) subcutaneous once a day (at bedtime)  Trulicity Pen 1.5 mg/0.5 mL subcutaneous solution: 1.5 subcutaneously once a week

## 2023-08-02 NOTE — DISCHARGE NOTE PROVIDER - HOSPITAL COURSE
c #Discharge: do not delete    48M with pmhx of HIV (compliant with Biktarvy), partial great toe amputation in Jan 2022, recent R great toe OM in May (s/p 6 week course of Cefepime and Vancomycin), DM, HTN who presents for a 1 day history of right foot pain and swelling admitted for R Hallux osteomyelitis.     #Osteomyelitis.   ·  Plan: Pt presenting with a 1 day history of swelling and pain to the R toe. Reports hx of OM of R great toe (has 3 month history of open wound to the toe) and completed 6 week course of antibiotics mid June (Vanc/Cefepime). Followed with a podiatrist, who reported improvement per x-ray, however 2 weeks ago again started to have purulence/swelling and was given 5 day course of Bactrim. s/p Vanc and Zosyn in the ED - XR concerning for OM. MRI 7/29 revealed Plantar skin wound with soft tissue tract extending to the proximal phalanx at the great toe and Rim-enhancing fluid within the tract concerning for infection. Per MRI findings, podiatry recommended surgery however patient strongly rejects surgical intervention and wants medical optimization only. ID will be consulted for recommendations for medical treatment.      -preliminary tissue cultures (7/30): rare corynebacteium striatum   -Pseudomonas, Sensitivity=4 to Cefepime  -bone biopsy (7/31): Reactive bone and small fragments of necrotic bone with reactive  fibrous tissue, No acute osteomyelitis identified  -f/u final tissue culture read  -did not meet SIRS criteria on admission  -dc Vanc and Zosyn 7/31 due to VIRAL as below  -Day 3 Daptomycin 900 mg IV Q24h. Check CK.  -Day 3 Cefepime 2g IV Q12h  -Follow up ID recs, transition to PO meds for above regimen  -Stressed importance of good follow-up after discharge with PCP, podiatry, and ID.    #VIRAL (acute kidney injury).   ·  Plan: - creatinine 1.66 on admission; Cr improving from 2.4 to 2.3 today 8/2.  - Bladder scan showed 63 mL   - Vanc trough improved from 22.9 7/31 to 13.5 on 8/1.  - UA negative nitrites, leukocyte esterase  - serum Na 140, Serum Cr 2.4, Urine Na 42, Urine Cr 99. FeNa 0.7% suggesting pre-renal etiology.  - intrinsic (vanc and zosyn nephrotoxicity) vs. pre-renal origin due to hypotension in ED   - patient verbalized that on prior admission for OM, inpatient team was monitoring elevated CK levels while on vanc  - baseline Cr prior to hospitalization was 1.5 per PCP (Dr. Dena Ochoa) records from patient online portal  - encourage PO intake  - Trend Cr, f/u BMP daily.    #HIV disease.   ·  Plan: pt reports compliance with Biktarvy, continue while inpatient  last VL in April 2023 was undetectable    -Cont Biktarvy 1 tab daily.    #Hypertension.   ·  Plan: Hold Losartan 25mg PO qD, given patient's acute worsening of VIRAL.    #Hyperlipidemia.   ·  Plan: c/w Atorvastatin 20mg PO qD  -pt states he has been using a baby aspirin for many years, no known CAD. will continue inpatient.      #Diabetes mellitus with hypoglycemia.   ·  Plan: Pt reports hx of diabetes, complicated with diabetic neuropathy  Uses trulicity 1.5 and Basaglar 70u at home  HbA1C 7.2%, Estimated average glucose 160  Patient reports eating well, not skipping meals.   -8/1: Patient's 9/10 PM glucose high on previous several nights 230, 181, 227. Patient usually eats dinner 7:30/8PM. Increased Lispro 4u pre-dinner.  -8/2: 10:36 PM glucose post-dinner last night 8/1 still elevated at 213.   -Continue Lantus 35u  -Continue Lispro 2u BID (pre-breakfast, pre-lunch)    Patient was discharged to: Home    New medications:   Changes to old medications:  Medications that were stopped:    Items to follow up as outpatient: Please follow up with your PCP, Podiatry, and Infectious Disease within 1-2 weeks.      Physical exam at the time of discharge:    General: NAD  HEENT: NC/AT; anicteric sclera; MMM  Neck: supple w/o palpable nodularity  Cardiovascular: +S1/S2; RRR  Respiratory: CTA B/L; no W/R/R  Gastrointestinal: soft, NT/ND; +BS x4 quadrants  Extremities: R distal great toe s/p amputation. R LE wrapped, unable to visualize ulcer. R foot warm, no edema, no erythema. L lower extremity unremarkable.  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     #Osteomyelitis.   Pt presented with a 1 day history of swelling and pain to the R toe. Reports hx of OM of R great toe (has 3 month history of open wound to the toe) and completed 6 week course of antibiotics mid June (Vanc/Cefepime). Followed with a podiatrist, who reported improvement per x-ray, however 2 weeks ago again started to have purulence/swelling and was given 5 day course of Bactrim. Patient received Vancomycin and Zosyn in the ED. XR concerning for OM. Podiatry consulted, wrapped wound and tissue cultures and bone biopsy sent. MRI 7/29 revealed Plantar skin wound with soft tissue tract extending to the proximal phalanx at the great toe and Rim-enhancing fluid within the tract concerning for infection. Per MRI findings, podiatry recommended surgery however patient strongly rejects surgical intervention and wants medical optimization only. ID consulted for recommendations for medical treatment and started on IV Cefepime and Daptomycin. Bone biopsy revealed no acute osteomyelitis with small fragments of necrotic bone. Preliminary tissue cultures read rare corynebacterium striatum. Switched to PO antibiotics for discharge.   -preliminary tissue cultures (7/30): rare corynebacteium striatum   -Pseudomonas, Sensitivity=4 to Cefepime  -bone biopsy (7/31): Reactive bone and small fragments of necrotic bone with reactive  fibrous tissue, No acute osteomyelitis identified  -f/u final tissue culture read  -did not meet SIRS criteria on admission  -dc Vanc and Zosyn 7/31 due to VIRAL as below  -Day 3 Daptomycin 900 mg IV Q24h. Check CK.  -Day 3 Cefepime 2g IV Q12h  -Follow up ID recs, transition to PO meds for above regimen  -Stressed importance of good follow-up after discharge with PCP, podiatry, and ID.    #VIRAL (acute kidney injury).   ·  Plan: - creatinine 1.66 on admission; Cr improving from 2.4 to 2.3 today 8/2.  - Bladder scan showed 63 mL   - Vanc trough improved from 22.9 7/31 to 13.5 on 8/1.  - UA negative nitrites, leukocyte esterase  - serum Na 140, Serum Cr 2.4, Urine Na 42, Urine Cr 99. FeNa 0.7% suggesting pre-renal etiology.  - intrinsic (vanc and zosyn nephrotoxicity) vs. pre-renal origin due to hypotension in ED   - patient verbalized that on prior admission for OM, inpatient team was monitoring elevated CK levels while on vanc  - baseline Cr prior to hospitalization was 1.5 per PCP (Dr. Dena Ochoa) records from patient online portal  - encourage PO intake  - Trend Cr, f/u BMP daily.    #HIV disease.   ·  Plan: pt reports compliance with Biktarvy, continue while inpatient  last VL in April 2023 was undetectable    -Cont Biktarvy 1 tab daily.    #Hypertension.   ·  Plan: Hold Losartan 25mg PO qD, given patient's acute worsening of VIRAL.    #Hyperlipidemia.   ·  Plan: c/w Atorvastatin 20mg PO qD  -pt states he has been using a baby aspirin for many years, no known CAD. will continue inpatient.      #Diabetes mellitus with hypoglycemia.   ·  Plan: Pt reports hx of diabetes, complicated with diabetic neuropathy  Uses trulicity 1.5 and Basaglar 70u at home  HbA1C 7.2%, Estimated average glucose 160  Patient reports eating well, not skipping meals.   -8/1: Patient's 9/10 PM glucose high on previous several nights 230, 181, 227. Patient usually eats dinner 7:30/8PM. Increased Lispro 4u pre-dinner.  -8/2: 10:36 PM glucose post-dinner last night 8/1 still elevated at 213.   -Continue Lantus 35u  -Continue Lispro 2u BID (pre-breakfast, pre-lunch)    Patient was discharged to: Home    New medications:   Changes to old medications: N/A  Medications that were stopped: Losartan     Items to follow up as outpatient: Please follow up with your PCP, Podiatry, and Infectious Disease within 1-2 weeks.      Physical exam at the time of discharge:    General: NAD  HEENT: NC/AT; anicteric sclera; MMM  Neck: supple w/o palpable nodularity  Cardiovascular: +S1/S2; RRR  Respiratory: CTA B/L; no W/R/R  Gastrointestinal: soft, NT/ND; +BS x4 quadrants  Extremities: R distal great toe s/p amputation. R LE wrapped, unable to visualize ulcer. R foot warm, no edema, no erythema. L lower extremity unremarkable.  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     #Osteomyelitis.   Pt presented with a 1 day history of swelling and pain to the R toe. Reports hx of OM of R great toe (has 3 month history of open wound to the toe) and completed 6 week course of antibiotics mid June (Vanc/Cefepime). Followed with a podiatrist, who reported improvement per x-ray, however 2 weeks ago again started to have purulence/swelling and was given 5 day course of Bactrim. Patient received Vancomycin and Zosyn in the ED. XR concerning for OM. Podiatry consulted, wrapped wound and tissue cultures and bone biopsy sent. MRI 7/29 revealed Plantar skin wound with soft tissue tract extending to the proximal phalanx at the great toe and Rim-enhancing fluid within the tract concerning for infection. Per MRI findings, podiatry recommended surgery however patient strongly rejects surgical intervention and wants medical optimization only. ID consulted for recommendations for medical treatment and started on IV Cefepime and Daptomycin. Bone biopsy revealed no acute osteomyelitis with small fragments of necrotic bone. Final tissue cultures read rare pseudomonas aeruginosa, enterococcus faecalis, enterobacter cloacae. Switched to PO antibiotics for discharge.   -final tissue cultures (7/30): rare pseudomonas aeruginosa, enterococcus faecalis, enterobacter cloacae. Switched to PO antibiotics for discharge.   -Pseudomonas, Sensitivity=4 to Cefepime  -bone biopsy (7/31): Reactive bone and small fragments of necrotic bone with reactive  fibrous tissue, No acute osteomyelitis identified  -f/u final tissue culture read  -did not meet SIRS criteria on admission  -dc Vanc and Zosyn 7/31 due to VIRAL as below  -Day 3 Daptomycin 900 mg IV Q24h. Check CK.  -Day 3 Cefepime 2g IV Q12h  -Follow up ID recs, transition to PO meds for above regimen  -Stressed importance of good follow-up after discharge with PCP, podiatry, and ID.    #VIRAL (acute kidney injury).   Creatinine 1.66 on admission. Creatinine worsened likely intrinsic (vanc and zosyn nephrotoxicity) vs. pre-renal origin due to hypotension in ED. Vancomycin and Zosyn discontinued. Vancomycin trough measured which was elevated at 22.9 and on repeat Vancomycin trough decreased to 13.5. Cr peaked at 2.4 8/1, decreased to 2.3 8/2.   - Bladder scan showed 63 mL   - Vanc trough improved from 22.9 7/31 to 13.5 on 8/1.  - UA negative nitrites, leukocyte esterase  - serum Na 140, Serum Cr 2.4, Urine Na 42, Urine Cr 99. FeNa 0.7% suggesting pre-renal etiology.  - intrinsic (vanc and zosyn nephrotoxicity) vs. pre-renal origin due to hypotension in ED   - patient verbalized that on prior admission for OM, inpatient team was monitoring elevated CK levels while on vanc  - baseline Cr prior to hospitalization was 1.5 per PCP (Dr. Dena Ochoa) records from patient online portal  - encourage PO intake  - Trend Cr, f/u BMP daily.    #HIV disease.   Pt reports compliance with Biktarvy, continued while inpatient.  last VL in April 2023 was undetectable    -Cont Biktarvy 1 tab daily.    #Hypertension.   Held Losartan 25mg PO qD, given patient's acute worsening of VIRAL.    #Hyperlipidemia.   Continued home medication Atorvastatin 20mg PO qD  -pt states he has been using a baby aspirin for many years, no known CAD. Continue inpatient.      #Diabetes mellitus with hypoglycemia.   ·  Plan: Pt reports hx of diabetes, complicated with diabetic neuropathy  Uses trulicity 1.5 and Basaglar 70u at home  HbA1C 7.2%, Estimated average glucose 160  Patient reports eating well, not skipping meals.   -8/1: Patient's 9/10 PM glucose high on previous several nights 230, 181, 227. Patient usually eats dinner 7:30/8PM. Increased Lispro 4u pre-dinner.  -8/2: 10:36 PM glucose post-dinner last night 8/1 still elevated at 213. Increased Lispro 6u pre-dinner.   -Continue Lantus 35u  -Continue Lispro 6u BID (pre-breakfast, pre-lunch)    Patient was discharged to: Home    New medications:   Changes to old medications: N/A  Medications that were stopped: Losartan     Items to follow up as outpatient: Please follow up with your PCP, Podiatry, and Infectious Disease within 1-2 weeks.      Physical exam at the time of discharge:    General: NAD  HEENT: NC/AT; anicteric sclera; MMM  Neck: supple w/o palpable nodularity  Cardiovascular: +S1/S2; RRR  Respiratory: CTA B/L; no W/R/R  Gastrointestinal: soft, NT/ND; +BS x4 quadrants  Extremities: R distal great toe s/p amputation. R LE wrapped, unable to visualize ulcer. R foot warm, no edema, no erythema. L lower extremity unremarkable.  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     #Osteomyelitis.   Pt presented with a 1 day history of swelling and pain to the R toe. Reports hx of OM of R great toe (has 3 month history of open wound to the toe) and completed 6 week course of antibiotics mid June (Vanc/Cefepime). Followed with a podiatrist, who reported improvement per x-ray, however 2 weeks ago again started to have purulence/swelling and was given 5 day course of Bactrim. Patient received Vancomycin and Zosyn in the ED. XR concerning for OM. Podiatry consulted, wrapped wound and tissue cultures and bone biopsy sent. MRI 7/29 revealed Plantar skin wound with soft tissue tract extending to the proximal phalanx at the great toe and Rim-enhancing fluid within the tract concerning for infection. Per MRI findings, podiatry recommended surgery however patient strongly rejects surgical intervention and wants medical optimization only. ID consulted for recommendations for medical treatment and started on IV Cefepime and Daptomycin. Bone biopsy revealed no acute osteomyelitis with small fragments of necrotic bone. Final tissue cultures read rare pseudomonas aeruginosa, enterococcus faecalis, enterobacter cloacae. Switched to PO antibiotics for discharge.   -final tissue cultures (7/30): rare pseudomonas aeruginosa, enterococcus faecalis, enterobacter cloacae. Switched to PO antibiotics for discharge.   -Pseudomonas, Sensitivity=4 to Cefepime  -bone biopsy (7/31): Reactive bone and small fragments of necrotic bone with reactive  fibrous tissue, No acute osteomyelitis identified  -f/u final tissue culture read  -did not meet SIRS criteria on admission  -dc Vanc and Zosyn 7/31 due to VIRAL as below  -Day 3 Daptomycin 900 mg IV Q24h. Check CK.  -Day 3 Cefepime 2g IV Q12h  -Follow up ID recs, transition to PO meds for above regimen  -Stressed importance of good follow-up after discharge with PCP, podiatry, and ID.    #VIRAL (acute kidney injury).   Creatinine 1.66 on admission. Creatinine worsened likely intrinsic (vanc and zosyn nephrotoxicity) vs. pre-renal origin due to hypotension in ED. Vancomycin and Zosyn discontinued. Vancomycin trough measured which was elevated at 22.9 and on repeat Vancomycin trough decreased to 13.5. Cr peaked at 2.4 8/1, decreased to 2.3 8/2.   - Bladder scan showed 63 mL   - Vanc trough improved from 22.9 7/31 to 13.5 on 8/1.  - UA negative nitrites, leukocyte esterase  - serum Na 140, Serum Cr 2.4, Urine Na 42, Urine Cr 99. FeNa 0.7% suggesting pre-renal etiology.  - intrinsic (vanc and zosyn nephrotoxicity) vs. pre-renal origin due to hypotension in ED   - patient verbalized that on prior admission for OM, inpatient team was monitoring elevated CK levels while on vanc  - baseline Cr prior to hospitalization was 1.5 per PCP (Dr. Dena Ochoa) records from patient online portal  - encourage PO intake  - Trend Cr, f/u BMP daily.    #Asymptomatic HIV disease.   Pt reports compliance with Biktarvy, continued while inpatient.  last VL in April 2023 was undetectable    -Cont Biktarvy 1 tab daily.    #Hypertension.   Held Losartan 25mg PO qD, given patient's acute worsening of VIRAL.    #Hyperlipidemia.   Continued home medication Atorvastatin 20mg PO qD  -pt states he has been using a baby aspirin for many years, no known CAD. Continue inpatient.      #Diabetes mellitus with hypoglycemia.   ·  Plan: Pt reports hx of diabetes, complicated with diabetic neuropathy  Uses trulicity 1.5 and Basaglar 70u at home  HbA1C 7.2%, Estimated average glucose 160  Patient reports eating well, not skipping meals.   -8/1: Patient's 9/10 PM glucose high on previous several nights 230, 181, 227. Patient usually eats dinner 7:30/8PM. Increased Lispro 4u pre-dinner.  -8/2: 10:36 PM glucose post-dinner last night 8/1 still elevated at 213. Increased Lispro 6u pre-dinner.   -Continue Lantus 35u  -Continue Lispro 6u BID (pre-breakfast, pre-lunch)    Patient was discharged to: Home    New medications:   Changes to old medications: N/A  Medications that were stopped: Losartan     Items to follow up as outpatient: Please follow up with your PCP, Podiatry, and Infectious Disease within 1-2 weeks.      Physical exam at the time of discharge:    General: NAD  HEENT: NC/AT; anicteric sclera; MMM  Neck: supple w/o palpable nodularity  Cardiovascular: +S1/S2; RRR  Respiratory: CTA B/L; no W/R/R  Gastrointestinal: soft, NT/ND; +BS x4 quadrants  Extremities: R distal great toe s/p amputation. R LE wrapped, unable to visualize ulcer. R foot warm, no edema, no erythema. L lower extremity unremarkable.  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     #Osteomyelitis.   Pt presented with a 1 day history of swelling and pain to the R toe. Reports hx of OM of R great toe (has 3 month history of open wound to the toe) and completed 6 week course of antibiotics mid June (Vanc/Cefepime). Followed with a podiatrist, who reported improvement per x-ray, however 2 weeks ago again started to have purulence/swelling and was given 5 day course of Bactrim. Patient received Vancomycin and Zosyn in the ED. XR concerning for OM. Podiatry consulted, wrapped wound and tissue cultures and bone biopsy sent. MRI 7/29 revealed Plantar skin wound with soft tissue tract extending to the proximal phalanx at the great toe and Rim-enhancing fluid within the tract concerning for infection. Per MRI findings, podiatry recommended surgery however patient strongly rejects surgical intervention and wants medical optimization only. ID consulted for recommendations for medical treatment and started on IV Cefepime and Daptomycin. Bone biopsy revealed no acute osteomyelitis with small fragments of necrotic bone. Final tissue cultures read rare pseudomonas aeruginosa, enterococcus faecalis, enterobacter cloacae. Switched to PO antibiotics for discharge.   -final tissue cultures (7/30): rare pseudomonas aeruginosa, enterococcus faecalis, enterobacter cloacae. Switched to PO antibiotics for discharge.   -Pseudomonas, Sensitivity=4 to Cefepime  -bone biopsy (7/31): Reactive bone and small fragments of necrotic bone with reactive  fibrous tissue, No acute osteomyelitis identified  -f/u final tissue culture read  -did not meet SIRS criteria on admission  -dc Vanc and Zosyn 7/31 due to VIRAL as below  -Day 3 Daptomycin 900 mg IV Q24h. Check CK.  -Day 3 Cefepime 2g IV Q12h  -Follow up ID recs, transition to PO meds for above regimen  -Stressed importance of good follow-up after discharge with PCP, podiatry, and ID.    #VIRAL (acute kidney injury).   Creatinine 1.66 on admission. Creatinine worsened likely intrinsic (vanc and zosyn nephrotoxicity) vs. pre-renal origin due to hypotension in ED. Vancomycin and Zosyn discontinued. Vancomycin trough measured which was elevated at 22.9 and on repeat Vancomycin trough decreased to 13.5. Cr peaked at 2.4 8/1, decreased to 2.3 8/2.   - Bladder scan showed 63 mL   - Vanc trough improved from 22.9 7/31 to 13.5 on 8/1.  - UA negative nitrites, leukocyte esterase  - serum Na 140, Serum Cr 2.4, Urine Na 42, Urine Cr 99. FeNa 0.7% suggesting pre-renal etiology.  - intrinsic (vanc and zosyn nephrotoxicity) vs. pre-renal origin due to hypotension in ED   - patient verbalized that on prior admission for OM, inpatient team was monitoring elevated CK levels while on vanc  - baseline Cr prior to hospitalization was 1.5 per PCP (Dr. Dena Ochoa) records from patient online portal  - encourage PO intake  - Trend Cr, f/u BMP daily.    #Asymptomatic HIV disease.   Pt reports compliance with Biktarvy, continued while inpatient.  last VL in April 2023 was undetectable    -Cont Biktarvy 1 tab daily.    #Hypertension.   Held Losartan 25mg PO qD, given patient's acute worsening of VIRAL.    #Hyperlipidemia.   Continued home medication Atorvastatin 20mg PO qD  -pt states he has been using a baby aspirin for many years, no known CAD. Continue inpatient.      #Diabetes mellitus with hypoglycemia.   ·  Plan: Pt reports hx of diabetes, complicated with diabetic neuropathy  Uses trulicity 1.5 and Basaglar 70u at home  HbA1C 7.2%, Estimated average glucose 160  Patient reports eating well, not skipping meals.   -8/1: Patient's 9/10 PM glucose high on previous several nights 230, 181, 227. Patient usually eats dinner 7:30/8PM. Increased Lispro 4u pre-dinner.  -8/2: 10:36 PM glucose post-dinner last night 8/1 still elevated at 213. Increased Lispro 6u pre-dinner.   -Continue Lantus 35u  -Continue Lispro 6u BID (pre-breakfast, pre-lunch)    Patient was discharged to: Home    New medications: Amoxicllin 875mg q12h, Cipro 750 q12, Cefpodoxime 400 q12 until 08/06/23  Changes to old medications: N/A  Medications that were stopped: Losartan     Items to follow up as outpatient: Please follow up with your PCP, Podiatry, and Infectious Disease within 1-2 weeks.      Physical exam at the time of discharge:    General: NAD  HEENT: NC/AT; anicteric sclera; MMM  Neck: supple w/o palpable nodularity  Cardiovascular: +S1/S2; RRR  Respiratory: CTA B/L; no W/R/R  Gastrointestinal: soft, NT/ND; +BS x4 quadrants  Extremities: R distal great toe s/p amputation. R LE wrapped, unable to visualize ulcer. R foot warm, no edema, no erythema. L lower extremity unremarkable.  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     #Osteomyelitis.   Pt presented with a 1 day history of swelling and pain to the R toe. Reports hx of OM of R great toe (has 3 month history of open wound to the toe) and completed 6 week course of antibiotics mid June (Vanc/Cefepime). Followed with a podiatrist, who reported improvement per x-ray, however 2 weeks ago again started to have purulence/swelling and was given 5 day course of Bactrim. Patient received Vancomycin and Zosyn in the ED. XR concerning for OM. Podiatry consulted, wrapped wound and tissue cultures and bone biopsy sent. MRI 7/29 revealed Plantar skin wound with soft tissue tract extending to the proximal phalanx at the great toe and Rim-enhancing fluid within the tract concerning for infection. Per MRI findings, podiatry recommended surgery however patient strongly rejects surgical intervention and wants medical optimization only. ID consulted for recommendations for medical treatment and started on IV Cefepime and Daptomycin. Bone biopsy revealed no acute osteomyelitis with small fragments of necrotic bone. Final tissue cultures read rare pseudomonas aeruginosa, enterococcus faecalis, enterobacter cloacae. Switched to PO antibiotics for discharge.   -final tissue cultures (7/30): rare pseudomonas aeruginosa, enterococcus faecalis, enterobacter cloacae. Switched to PO antibiotics for discharge.   -Pseudomonas, Sensitivity=4 to Cefepime  -bone biopsy (7/31): Reactive bone and small fragments of necrotic bone with reactive  fibrous tissue, No acute osteomyelitis identified  -f/u final tissue culture read  -did not meet SIRS criteria on admission  -dc Vanc and Zosyn 7/31 due to VIRAL as below  -Day 3 Daptomycin 900 mg IV Q24h. Check CK.  -Day 3 Cefepime 2g IV Q12h  -Follow up ID recs, transition to PO meds for above regimen  -Stressed importance of good follow-up after discharge with PCP, podiatry, and ID.    #VIRAL (acute kidney injury).   Creatinine 1.66 on admission. Creatinine worsened likely intrinsic (vanc and zosyn nephrotoxicity) vs. pre-renal origin due to hypotension in ED. Vancomycin and Zosyn discontinued. Vancomycin trough measured which was elevated at 22.9 and on repeat Vancomycin trough decreased to 13.5. Cr peaked at 2.4 8/1, decreased to 2.3 8/2.   - Bladder scan showed 63 mL   - Vanc trough improved from 22.9 7/31 to 13.5 on 8/1.  - UA negative nitrites, leukocyte esterase  - serum Na 140, Serum Cr 2.4, Urine Na 42, Urine Cr 99. FeNa 0.7% suggesting pre-renal etiology.  - intrinsic (vanc and zosyn nephrotoxicity) vs. pre-renal origin due to hypotension in ED   - patient verbalized that on prior admission for OM, inpatient team was monitoring elevated CK levels while on vanc  - baseline Cr prior to hospitalization was 1.5 per PCP (Dr. Dena Ochoa) records from patient online portal  - encourage PO intake  - Trend Cr, f/u BMP daily.    #Asymptomatic HIV disease.   Pt reports compliance with Biktarvy, continued while inpatient.  last VL in April 2023 was undetectable    -Cont Biktarvy 1 tab daily.    #Hypertension.   Held Losartan 25mg PO qD, given patient's acute worsening of VIRAL.    #Hyperlipidemia.   Continued home medication Atorvastatin 20mg PO qD  -pt states he has been using a baby aspirin for many years, no known CAD. Continue inpatient.      #Diabetes mellitus with hypoglycemia.   ·  Plan: Pt reports hx of diabetes, complicated with diabetic neuropathy  Uses trulicity 1.5 and Basaglar 70u at home  HbA1C 7.2%, Estimated average glucose 160  Patient reports eating well, not skipping meals.   -8/1: Patient's 9/10 PM glucose high on previous several nights 230, 181, 227. Patient usually eats dinner 7:30/8PM. Increased Lispro 4u pre-dinner.  -8/2: 10:36 PM glucose post-dinner last night 8/1 still elevated at 213. Increased Lispro 6u pre-dinner.   -Continue Lantus 35u  -Continue Lispro 6u BID (pre-breakfast, pre-lunch)    Patient was discharged to: Home    New medications: Amoxicllin 875mg q12h, Cipro 750 q12, Cefpodoxime 400 q12 until 08/06/23  Changes to old medications: N/A  Medications that were stopped: Losartan     Items to follow up as outpatient:   Please follow up with your PCP Dr. Antony on August 7th at 11:20 AM.  Please follow up with Infectious Disease within 1-2 weeks. They will call you with your appointment.  Please follow up with Podiatry within 1-2 weeks. (details below for Dr. Marquez)    Please follow up with Dr. Ortega Fernandez within 1 week of discharge.  Office information:   -[place] Address- [address], [place] [address] Phone: [phone]  -[place] Address- [address] [facility] [address] [address] Phone: [phone]      Physical exam at the time of discharge:    General: NAD  HEENT: NC/AT; anicteric sclera; MMM  Neck: supple w/o palpable nodularity  Cardiovascular: +S1/S2; RRR  Respiratory: CTA B/L; no W/R/R  Gastrointestinal: soft, NT/ND; +BS x4 quadrants  Extremities: R distal great toe s/p amputation. R LE wrapped, unable to visualize ulcer. R foot warm, no edema, no erythema. L lower extremity unremarkable.  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     #Osteomyelitis.   Pt presented with a 1 day history of swelling and pain to the R toe. Reports hx of OM of R great toe (has 3 month history of open wound to the toe) and completed 6 week course of antibiotics mid June (Vanc/Cefepime). Followed with a podiatrist, who reported improvement per x-ray, however 2 weeks ago again started to have purulence/swelling and was given 5 day course of Bactrim. Patient received Vancomycin and Zosyn in the ED. XR concerning for OM. Podiatry consulted, wrapped wound and tissue cultures and bone biopsy sent. MRI 7/29 revealed Plantar skin wound with soft tissue tract extending to the proximal phalanx at the great toe and Rim-enhancing fluid within the tract concerning for infection. Per MRI findings, podiatry recommended surgery however patient strongly rejects surgical intervention and wants medical optimization only. ID consulted for recommendations for medical treatment and started on IV Cefepime and Daptomycin. Bone biopsy revealed no acute osteomyelitis with small fragments of necrotic bone. Final tissue cultures read rare pseudomonas aeruginosa, enterococcus faecalis, enterobacter cloacae. Switched to PO antibiotics for discharge.   -final tissue cultures (7/30): rare pseudomonas aeruginosa, enterococcus faecalis, enterobacter cloacae. Switched to PO antibiotics for discharge.   -Pseudomonas, Sensitivity=4 to Cefepime  -bone biopsy (7/31): Reactive bone and small fragments of necrotic bone with reactive  fibrous tissue, No acute osteomyelitis identified  -f/u final tissue culture read  -did not meet SIRS criteria on admission  -dc Vanc and Zosyn 7/31 due to VIRAL as below  -Day 3 Daptomycin 900 mg IV Q24h. Check CK.  -Day 3 Cefepime 2g IV Q12h  -Follow up ID recs, transition to PO meds for above regimen  -Stressed importance of good follow-up after discharge with PCP, podiatry, and ID.    #VIRAL (acute kidney injury).   Creatinine 1.66 on admission. Creatinine worsened likely intrinsic (vanc and zosyn nephrotoxicity) vs. pre-renal origin due to hypotension in ED. Vancomycin and Zosyn discontinued. Vancomycin trough measured which was elevated at 22.9 and on repeat Vancomycin trough decreased to 13.5. Cr peaked at 2.4 8/1, decreased to 2.3 8/2.   - Bladder scan showed 63 mL   - Vanc trough improved from 22.9 7/31 to 13.5 on 8/1.  - UA negative nitrites, leukocyte esterase  - serum Na 140, Serum Cr 2.4, Urine Na 42, Urine Cr 99. FeNa 0.7% suggesting pre-renal etiology.  - intrinsic (vanc and zosyn nephrotoxicity) vs. pre-renal origin due to hypotension in ED   - patient verbalized that on prior admission for OM, inpatient team was monitoring elevated CK levels while on vanc  - baseline Cr prior to hospitalization was 1.5 per PCP (Dr. Dena Ochoa) records from patient online portal  - encourage PO intake  - Trend Cr, f/u BMP daily.    #Asymptomatic HIV disease.   Pt reports compliance with Biktarvy, continued while inpatient.  last VL in April 2023 was undetectable    -Cont Biktarvy 1 tab daily.    #Hypertension.   Held Losartan 25mg PO qD, given patient's acute worsening of VIRAL.    #Hyperlipidemia.   Continued home medication Atorvastatin 20mg PO qD  -pt states he has been using a baby aspirin for many years, no known CAD. Continue inpatient.      #Diabetes mellitus with hypoglycemia.   ·  Plan: Pt reports hx of diabetes, complicated with diabetic neuropathy  Uses trulicity 1.5 and Basaglar 70u at home  HbA1C 7.2%, Estimated average glucose 160  Patient reports eating well, not skipping meals.   -8/1: Patient's 9/10 PM glucose high on previous several nights 230, 181, 227. Patient usually eats dinner 7:30/8PM. Increased Lispro 4u pre-dinner.  -8/2: 10:36 PM glucose post-dinner last night 8/1 still elevated at 213. Increased Lispro 6u pre-dinner.   -Continue Lantus 35u  -Continue Lispro 6u BID (pre-breakfast, pre-lunch)    Patient was discharged to: Home    New medications: Amoxicllin 875mg q12h, Cipro 750 q12, Cefpodoxime 400 q12 until 08/06/23  Changes to old medications: N/A  Medications that were stopped: Losartan     Items to follow up as outpatient:   Please follow up with your PCP Dr. Antony on August 7th at 11:20 AM.  Please follow up with Infectious Disease within 1-2 weeks. They will call you with your appointment.  Please follow up with Podiatry within 1 week. (details below for Dr. Marquez)    Please follow up with Dr. Ortega Fernandez within 1 week of discharge.  Office information:   -[place] Address- [address], [place] [address] Phone: [phone]  -[place] Address- [address] [address] Phone: [phone]      Physical exam at the time of discharge:    General: NAD  HEENT: NC/AT; anicteric sclera; MMM  Neck: supple w/o palpable nodularity  Cardiovascular: +S1/S2; RRR  Respiratory: CTA B/L; no W/R/R  Gastrointestinal: soft, NT/ND; +BS x4 quadrants  Extremities: R distal great toe s/p amputation. R LE wrapped, unable to visualize ulcer. R foot warm, no edema, no erythema. L lower extremity unremarkable.  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     #Osteomyelitis.   Pt presented with a 1 day history of swelling and pain to the R toe. Reports hx of OM of R great toe (has 3 month history of open wound to the toe) and completed 6 week course of antibiotics mid June (Vanc/Cefepime). Followed with a podiatrist, who reported improvement per x-ray, however 2 weeks ago again started to have purulence/swelling and was given 5 day course of Bactrim. Patient received Vancomycin and Zosyn in the ED. XR concerning for OM. Podiatry consulted, wrapped wound and tissue cultures and bone biopsy sent. MRI 7/29 revealed Plantar skin wound with soft tissue tract extending to the proximal phalanx at the great toe and Rim-enhancing fluid within the tract concerning for infection. Per MRI findings, podiatry recommended surgery however patient strongly rejects surgical intervention and wants medical optimization only. ID consulted for recommendations for medical treatment and started on IV Cefepime and Daptomycin. Bone biopsy revealed no acute osteomyelitis with small fragments of necrotic bone. Final tissue cultures read rare pseudomonas aeruginosa, enterococcus faecalis, enterobacter cloacae. Switched to PO antibiotics for discharge.   -final tissue cultures (7/30): rare pseudomonas aeruginosa, enterococcus faecalis, enterobacter cloacae. Switched to PO antibiotics for discharge.   -Pseudomonas, Sensitivity=4 to Cefepime  -bone biopsy (7/31): Reactive bone and small fragments of necrotic bone with reactive  fibrous tissue, No acute osteomyelitis identified  -f/u final tissue culture read  -did not meet SIRS criteria on admission  -dc Vanc and Zosyn 7/31 due to VIRAL as below  -Day 3 Daptomycin 900 mg IV Q24h. Check CK.  -Day 3 Cefepime 2g IV Q12h  -Follow up ID recs, transition to PO meds for above regimen  -Stressed importance of good follow-up after discharge with PCP, podiatry, and ID.    #Acute tubular necrosis (ATN)   Creatinine 1.66 on admission. Creatinine worsened likely intrinsic (vanc and zosyn nephrotoxicity) Vancomycin and Zosyn discontinued. Vancomycin trough measured which was elevated at 22.9 and on repeat Vancomycin trough decreased to 13.5. Cr peaked at 2.4 8/1, decreased to 2.3 8/2.   - Bladder scan showed 63 mL   - Vanc trough improved from 22.9 7/31 to 13.5 on 8/1.  - UA negative nitrites, leukocyte esterase  - serum Na 140, Serum Cr 2.4, Urine Na 42, Urine Cr 99. FeNa 0.7% suggesting pre-renal etiology.  - intrinsic (vanc and zosyn nephrotoxicity) vs. pre-renal origin due to hypotension in ED   - patient verbalized that on prior admission for OM, inpatient team was monitoring elevated CK levels while on vanc  - baseline Cr prior to hospitalization was 1.5 per PCP (Dr. Dena Ochoa) records from patient online portal  - encourage PO intake  - Trend Cr, f/u BMP daily.    #Asymptomatic HIV disease.   Pt reports compliance with Biktarvy, continued while inpatient.  last VL in April 2023 was undetectable    -Cont Biktarvy 1 tab daily.    #Hypertension.   Held Losartan 25mg PO qD, given patient's acute worsening of VIRAL.    #Hyperlipidemia.   Continued home medication Atorvastatin 20mg PO qD  -pt states he has been using a baby aspirin for many years, no known CAD. Continue inpatient.      #Diabetes mellitus complicated by hypoglycemia.   ·  Plan: Pt reports hx of diabetes, complicated with diabetic neuropathy  Uses trulicity 1.5 and Basaglar 70u at home  HbA1C 7.2%, Estimated average glucose 160  Patient reports eating well, not skipping meals.   -8/1: Patient's 9/10 PM glucose high on previous several nights 230, 181, 227. Patient usually eats dinner 7:30/8PM. Increased Lispro 4u pre-dinner.  -8/2: 10:36 PM glucose post-dinner last night 8/1 still elevated at 213. Increased Lispro 6u pre-dinner.   -Continue Lantus 35u  -Continue Lispro 6u BID (pre-breakfast, pre-lunch)    Patient was discharged to: Home    New medications: Amoxicllin 875mg q12h, Cipro 750 q12, Cefpodoxime 400 q12 until 08/06/23  Changes to old medications: N/A  Medications that were stopped: Losartan     Items to follow up as outpatient:   Please follow up with your PCP Dr. Antony on August 7th at 11:20 AM.  Please follow up with Infectious Disease within 1-2 weeks. They will call you with your appointment.  Please follow up with Podiatry within 1 week. (details below for Dr. Marquez)    Please follow up with Dr. Ortega Fernandez within 1 week of discharge.  Office information:   -[place] Address- [address] [address] Phone: [phone]  -[place] Address- [address] [address] Phone: [phone]      Physical exam at the time of discharge:    General: NAD  HEENT: NC/AT; anicteric sclera; MMM  Neck: supple w/o palpable nodularity  Cardiovascular: +S1/S2; RRR  Respiratory: CTA B/L; no W/R/R  Gastrointestinal: soft, NT/ND; +BS x4 quadrants  Extremities: R distal great toe s/p amputation. R LE wrapped, unable to visualize ulcer. R foot warm, no edema, no erythema. L lower extremity unremarkable.  Vascular: 2+ radial, 1+ DP/PT pulses B/L  Neurological: AAOx3; no focal deficits

## 2023-08-02 NOTE — PROGRESS NOTE ADULT - PROVIDER SPECIALTY LIST ADULT
Infectious Disease
Internal Medicine
Podiatry
Podiatry
Infectious Disease
Podiatry
Internal Medicine
Internal Medicine
Hospitalist
Internal Medicine
Internal Medicine

## 2023-08-02 NOTE — PROGRESS NOTE ADULT - ASSESSMENT
47 y/o M with pmh HIV, DM, HTN, Partial R hallux amp(1/2022) admitted for R toe wound previously found to have OM & treated with 6 weeks IV abx.  XR with osteomyelitis of 1st proximal phalanx head. At time of consult, VSS, WBC 5.64, ESR 56, CRP 44.6. MRI with findings consistent of OM of the 1st ray proximal phalanx but bone biopsy shows corynebacterium growth on prelim culture and no signs of acute OM on pathology.     Plan:     - Follow up bone biopsy cx final results  - F/u ID reccs  - C/w IV antibiotics   - Local wound care: Cleansed with NS. Applied betadine soaked gauze, kerlix with tape.   - Rest of care per primary team    Plan d/w Attending. Podiatry following.  47 y/o M with pmh HIV, DM, HTN, Partial R hallux amp(1/2022) admitted for R toe wound previously found to have OM & treated with 6 weeks IV abx.  XR with osteomyelitis of 1st proximal phalanx head. At time of consult, VSS, WBC 5.64, ESR 56, CRP 44.6. MRI with findings consistent of OM of the 1st ray proximal phalanx but bone biopsy shows corynebacterium growth on prelim culture and no signs of acute OM on pathology.     Plan:     - Follow up bone biopsy cx final results  - F/u ID reccs  - C/w IV antibiotics   - Local wound care: Cleansed with NS. Applied betadine soaked gauze, kerlix with tape.   - Rest of care per primary team    Plan d/w Attending. Podiatry following.     Discharge dressing/offloading instructions: Cleanse wound with normal sailine daily, pat dry with sterile guaze, apply  betadine soaked gauze to wound base, cover with dry sterile gauze, wrap with Kerlix. Pt to be Heel WBAT upon discharge.     Patient should follow up with Dr. Ortega Fernandez within 1 week of discharge.    Office information:          [place] Address- [address], [place] [address] Phone: [phone]         [place] Address- [address], [place] [address] Phone: [phone]   47 y/o M with pmh HIV, DM, HTN, Partial R hallux amp(1/2022) admitted for R toe wound previously found to have OM & treated with 6 weeks IV abx.  XR with osteomyelitis of 1st proximal phalanx head. At time of consult, VSS, WBC 5.64, ESR 56, CRP 44.6. MRI with findings consistent of OM of the 1st ray proximal phalanx but bone biopsy shows corynebacterium growth on prelim culture and no signs of acute OM on pathology.     Plan:     - Follow up bone biopsy cx final results  - F/u ID reccs  - C/w IV antibiotics   - Local wound care: Cleansed with NS. Applied betadine soaked gauze, kerlix with tape.   - Rest of care per primary team    Plan d/w Attending. Podiatry following.     Discharge dressing/offloading instructions: Cleanse wound with normal sailine daily, pat dry with sterile guaze, apply  betadine soaked gauze to wound base, cover with dry sterile gauze, wrap with Kerlix. Pt to be R Heel WBAT upon discharge.     Patient should follow up with Dr. Ortega Fernandez within 1 week of discharge.    Office information:          [place] Address- [address], [place] [address] Phone: [phone]         [place] Address- [address] [facility] [address], [place] [address] Phone: [phone]

## 2023-08-02 NOTE — DISCHARGE NOTE PROVIDER - ATTENDING DISCHARGE PHYSICAL EXAMINATION:
Patient was seen and examined at bedside on 8/2/2023 at 1130 am. Patient reports that he feels well overall. No pain at site of foot. Denies CP, abdominal pain. ROS is otherwise negative. Vitals, labwork and pertinent imaging reviewed. Physical exam - NAD, AAO x 4, PERRLA, EOMI, supple neck, chest - CTA b/l, CV - rrr, s1s2, no m/r/g, abd - soft, NTND, + BS, ext - wwp, RLE wrapped, psych - normal affect, skin - no rash, back - midline    Plan  -Can c/w broad empiric coverage for now, c/w Daptomycin and Cefepime  -Podiatry performed I/D and sent cultures, pathology without evidence of OM, will d/c on oral abx  -Cr is now downtrending   -FS are currently stable, c/w Lantus 35 qHS, 2 pre meal for breakfast and lunch, and 6 for dinner

## 2023-08-02 NOTE — PROGRESS NOTE ADULT - SUBJECTIVE AND OBJECTIVE BOX
OVERNIGHT EVENTS: No acute events overnight.     SUBJECTIVE / INTERVAL HPI: Patient seen and examined at bedside - no complaints. He denies any foot pain, shortness of breath, abdominal pain. He is eating and drinking appropriately.    Vital Signs Last 24 Hrs  T(C): 37 (02 Aug 2023 05:53), Max: 37 (02 Aug 2023 05:53)  T(F): 98.6 (02 Aug 2023 05:53), Max: 98.6 (02 Aug 2023 05:53)  HR: 84 (02 Aug 2023 05:53) (80 - 84)  BP: 131/85 (02 Aug 2023 05:53) (131/85 - 158/91)  BP(mean): --  ABP: --  ABP(mean): --  RR: 19 (02 Aug 2023 05:53) (17 - 20)  SpO2: 98% (02 Aug 2023 05:53) (97% - 98%)    O2 Parameters below as of 02 Aug 2023 05:53  Patient On (Oxygen Delivery Method): room air          PHYSICAL EXAM:    General: NAD  HEENT: NC/AT; anicteric sclera; MMM  Neck: supple w/o palpable nodularity  Cardiovascular: +S1/S2; RRR  Respiratory: CTA B/L; no W/R/R  Gastrointestinal: soft, NT/ND; +BS x4 quadrants  Extremities: R distal great toe s/p amputation. R LE wrapped, unable to visualize ulcer. R foot warm, no edema, no erythema. L lower extremity unremarkable.  Vascular: 2+ radial, 1+ DP/PT pulses B/L  Neurological: AAOx3; no focal deficits    MEDICATIONS:  MEDICATIONS  (STANDING):  aspirin  chewable 81 milliGRAM(s) Oral every 24 hours  bictegravir 50 mG/emtricitabine 200 mG/tenofovir alafenamide 25 mG (BIKTARVY) 1 Tablet(s) Oral every 24 hours  dextrose 5%. 1000 milliLiter(s) (50 mL/Hr) IV Continuous <Continuous>  dextrose 5%. 1000 milliLiter(s) (100 mL/Hr) IV Continuous <Continuous>  dextrose 50% Injectable 25 Gram(s) IV Push once  dextrose 50% Injectable 12.5 Gram(s) IV Push once  dextrose 50% Injectable 25 Gram(s) IV Push once  glucagon  Injectable 1 milliGRAM(s) IntraMuscular once  heparin   Injectable 5000 Unit(s) SubCutaneous every 8 hours  insulin glargine Injectable (LANTUS) 50 Unit(s) SubCutaneous at bedtime  insulin lispro (ADMELOG) corrective regimen sliding scale   SubCutaneous Before meals and at bedtime  losartan 25 milliGRAM(s) Oral every 24 hours  piperacillin/tazobactam IVPB.. 4.5 Gram(s) IV Intermittent every 8 hours  vancomycin  IVPB 1750 milliGRAM(s) IV Intermittent every 12 hours    MEDICATIONS  (PRN):  acetaminophen     Tablet .. 650 milliGRAM(s) Oral every 6 hours PRN Mild Pain (1 - 3)  dextrose Oral Gel 15 Gram(s) Oral once PRN Blood Glucose LESS THAN 70 milliGRAM(s)/deciliter      ALLERGIES:  Allergies    shellfish (Short breath)  doxycycline (Diarrhea)    Intolerances        LABS:                            12.1   5.36  )-----------( 247      ( 02 Aug 2023 05:30 )             35.4           08-01    140  |  107  |  24<H>  ----------------------------<  114<H>  4.1   |  22  |  2.40<H>    Ca    8.4      01 Aug 2023 05:30  Phos  4.0     08-01  Mg     2.2     08-01    TPro  7.9  /  Alb  3.4  /  TBili  0.4  /  DBili  x   /  AST  15  /  ALT  10  /  AlkPhos  76  07-31      CAPILLARY BLOOD GLUCOSE      POCT Blood Glucose.: 230 mg/dL (31 Jul 2023 21:56)  POCT Blood Glucose.: 162 mg/dL (31 Jul 2023 18:06)  POCT Blood Glucose.: 131 mg/dL (31 Jul 2023 12:25)  POCT Blood Glucose.: 110 mg/dL (31 Jul 2023 08:36)      RADIOLOGY & ADDITIONAL TESTS:   MR FOOT WITH IV CONTRAST RIGHT dated 7/29/2023 9:52 AM    INDICATION: Pain and swelling at the big toe    IMPRESSION:    1.  Plantar skin wound with soft tissue tract extending to the proximal   phalanx at the great toe. Rim-enhancing fluid within the tract concerning   for infection.  2.  Diffuse soft tissue swelling as can be seen with cellulitis.  3.  Abnormal marrow signal and erosive change involving the first   proximal phalanx. Given the overlying skin wound, findings are concerning   for osteomyelitis.  4.  First metatarsophalangeal joint effusion, nonspecific finding that   can be seen with infection in the appropriate clinical setting. If   further evaluation is warranted, aspiration can be performed.     OVERNIGHT EVENTS: No acute events overnight.     SUBJECTIVE / INTERVAL HPI: Patient seen and examined at bedside - no complaints. He denies any foot pain, shortness of breath, abdominal pain. He is eating and drinking appropriately.    Vital Signs Last 24 Hrs  T(C): 37 (02 Aug 2023 05:53), Max: 37 (02 Aug 2023 05:53)  T(F): 98.6 (02 Aug 2023 05:53), Max: 98.6 (02 Aug 2023 05:53)  HR: 84 (02 Aug 2023 05:53) (80 - 84)  BP: 131/85 (02 Aug 2023 05:53) (131/85 - 158/91)  BP(mean): --  ABP: --  ABP(mean): --  RR: 19 (02 Aug 2023 05:53) (17 - 20)  SpO2: 98% (02 Aug 2023 05:53) (97% - 98%)    O2 Parameters below as of 02 Aug 2023 05:53  Patient On (Oxygen Delivery Method): room air          PHYSICAL EXAM:    General: NAD  HEENT: NC/AT; anicteric sclera; MMM  Neck: supple w/o palpable nodularity  Cardiovascular: +S1/S2; RRR  Respiratory: CTA B/L; no W/R/R  Gastrointestinal: soft, NT/ND; +BS x4 quadrants  Extremities: R distal great toe s/p amputation. R LE wrapped, unable to visualize ulcer. R foot warm, no edema, no erythema. L lower extremity unremarkable.  Vascular: 2+ radial, 1+ DP/PT pulses B/L  Neurological: AAOx3; no focal deficits    MEDICATIONS:  MEDICATIONS  (STANDING):  aspirin  chewable 81 milliGRAM(s) Oral every 24 hours  bictegravir 50 mG/emtricitabine 200 mG/tenofovir alafenamide 25 mG (BIKTARVY) 1 Tablet(s) Oral every 24 hours  dextrose 5%. 1000 milliLiter(s) (50 mL/Hr) IV Continuous <Continuous>  dextrose 5%. 1000 milliLiter(s) (100 mL/Hr) IV Continuous <Continuous>  dextrose 50% Injectable 25 Gram(s) IV Push once  dextrose 50% Injectable 12.5 Gram(s) IV Push once  dextrose 50% Injectable 25 Gram(s) IV Push once  glucagon  Injectable 1 milliGRAM(s) IntraMuscular once  heparin   Injectable 5000 Unit(s) SubCutaneous every 8 hours  insulin glargine Injectable (LANTUS) 50 Unit(s) SubCutaneous at bedtime  insulin lispro (ADMELOG) corrective regimen sliding scale   SubCutaneous Before meals and at bedtime  losartan 25 milliGRAM(s) Oral every 24 hours  piperacillin/tazobactam IVPB.. 4.5 Gram(s) IV Intermittent every 8 hours  vancomycin  IVPB 1750 milliGRAM(s) IV Intermittent every 12 hours    MEDICATIONS  (PRN):  acetaminophen     Tablet .. 650 milliGRAM(s) Oral every 6 hours PRN Mild Pain (1 - 3)  dextrose Oral Gel 15 Gram(s) Oral once PRN Blood Glucose LESS THAN 70 milliGRAM(s)/deciliter      ALLERGIES:  Allergies    shellfish (Short breath)  doxycycline (Diarrhea)    Intolerances        LABS:                            12.1   5.36  )-----------( 247      ( 02 Aug 2023 05:30 )             35.4         08-02    143  |  110<H>  |  29<H>  ----------------------------<  91  4.2   |  22  |  2.30<H>    Ca    9.0      02 Aug 2023 05:30  Phos  3.9     08-02  Mg     2.2     08-02      CAPILLARY BLOOD GLUCOSE      POCT Blood Glucose.: 93 mg/dL (02 Aug 2023 09:16)  POCT Blood Glucose.: 213 mg/dL (01 Aug 2023 22:36)  POCT Blood Glucose.: 132 mg/dL (01 Aug 2023 17:31)  POCT Blood Glucose.: 152 mg/dL (01 Aug 2023 12:33)      RADIOLOGY & ADDITIONAL TESTS:   MR FOOT WITH IV CONTRAST RIGHT dated 7/29/2023 9:52 AM    INDICATION: Pain and swelling at the big toe    IMPRESSION:    1.  Plantar skin wound with soft tissue tract extending to the proximal   phalanx at the great toe. Rim-enhancing fluid within the tract concerning   for infection.  2.  Diffuse soft tissue swelling as can be seen with cellulitis.  3.  Abnormal marrow signal and erosive change involving the first   proximal phalanx. Given the overlying skin wound, findings are concerning   for osteomyelitis.  4.  First metatarsophalangeal joint effusion, nonspecific finding that   can be seen with infection in the appropriate clinical setting. If   further evaluation is warranted, aspiration can be performed.     Statement Selected

## 2023-08-02 NOTE — PROGRESS NOTE ADULT - SUBJECTIVE AND OBJECTIVE BOX
Patient is a 48y old  Male who presents with a chief complaint of right hallux osteomyelitis (02 Aug 2023 07:34)      INTERVAL HPI/ OVERNIGHT EVENTS  Patient seen and evaluated at bedside      LABS                        12.1   5.36  )-----------( 247      ( 02 Aug 2023 05:30 )             35.4     08-02    143  |  110<H>  |  29<H>  ----------------------------<  91  4.2   |  22  |  2.30<H>    Ca    9.0      02 Aug 2023 05:30  Phos  3.9     08-02  Mg     2.2     08-02          ICU Vital Signs Last 24 Hrs  T(C): 37 (02 Aug 2023 05:53), Max: 37 (02 Aug 2023 05:53)  T(F): 98.6 (02 Aug 2023 05:53), Max: 98.6 (02 Aug 2023 05:53)  HR: 84 (02 Aug 2023 05:53) (80 - 84)  BP: 131/85 (02 Aug 2023 05:53) (131/85 - 158/91)  BP(mean): --  ABP: --  ABP(mean): --  RR: 19 (02 Aug 2023 05:53) (17 - 20)  SpO2: 98% (02 Aug 2023 05:53) (97% - 98%)    O2 Parameters below as of 02 Aug 2023 05:53  Patient On (Oxygen Delivery Method): room air      RADIOLOGY  < from: MR Foot w/ IV Cont, Right (07.29.23 @ 09:52) >  IMPRESSION:    1.  Plantar skin wound with soft tissue tract extending to the proximal   phalanx at the great toe. Rim-enhancing fluid within the tract concerning   for infection.  2.  Diffuse soft tissue swelling as can be seen with cellulitis.  3.  Abnormal marrow signal and erosive change involving the first   proximal phalanx. Given the overlying skin wound, findings areconcerning   for osteomyelitis.  4.  First metatarsophalangeal joint effusion, nonspecific finding that   can be seen with infection in the appropriate clinical setting. If   further evaluation is warranted, aspiration can be performed.  < end of copied text >        MICROBIOLOGY  Culture - Tissue with Gram Stain (07.31.23 @ 10:34)    Gram Stain:   No organisms seen  No WBC's seen.   Specimen Source: .Tissue right halux   Culture Results:   Rare Corynebacterium striatum group    Culture - Surgical Swab (07.30.23 @ 08:33)    Gram Stain:   Few-moderate White blood cells  No organisms seen   -  Ampicillin: S <=2 Predicts results to ampicillin/sulbactam, amoxacillin-clavulanate and  piperacillin-tazobactam.   -  Aztreonam: S <=4   -  Cefepime: S 4   -  Ciprofloxacin: S <=0.25   -  Levofloxacin: S 1   -  Piperacillin/Tazobactam: S <=8   -  Tobramycin: S <=2   -  Vancomycin: S 2   Specimen Source: .Surgical Swab Surgical Swab   Culture Results:   Rare Pseudomonas aeruginosa  Rare Enterobacter cloacae complex Susceptibility to follow.  Rare Enterococcus faecalis   Organism Identification: Pseudomonas aeruginosa  Enterococcus faecalis   Organism: Pseudomonas aeruginosa   Organism: Enterococcus faecalis   Method Type: ORLIN   Method Type: ORLIN    PATHOLOGY:  Specimen(s) Submitted  1  Right hallux bone biopsy    Final Diagnosis  1.  Right hallux bone biopsy:  -Reactive bone and small fragments of necrotic bone with reactive  fibrous tissue  -   No acute osteomyelitis identified      PHYSICAL EXAM  Lower Extremity Focused  Vasc: DP/PT 2/4 b/l, edema to R dorsal foot and R hallux, increased warmth to R dorsal foot  Derm: sub R hallux wound 2.5 x 1.7 x 0.5 cm granular wound with macerated border, serous drainage, positive PTB, no tracking or tunneling; no open wounds left  Neuro: protective sensation diminished b/l  MSK: Partial hallux amp R:

## 2023-08-02 NOTE — DISCHARGE NOTE PROVIDER - NSDCCPCAREPLAN_GEN_ALL_CORE_FT
PRINCIPAL DISCHARGE DIAGNOSIS  Diagnosis: Acute osteomyelitis  Assessment and Plan of Treatment: While you were here you were diagnosed and treated for osteomyelitis which you were treated for with the antibiotics Daptomycin and Cefepime through IV. You will continue on oral antibiotics Ciprofloxacin 750 mg every 12 hours, Linezolid 600mg every 12 hours, Cefpodoxime 400 every 12 hours until . Osteomyelitis is an infection of bone that can be associated with inflammation as well. This infection can reach a bone by traveling through the bloodstream or by spreading from nearby tissue. Infection can also begin in the bone itself if an injury exposes the bone to germs. Symptoms of osteomyelitis include fever, swelling, warmth/redness in the area, pain, and fatigue. Sometimes there may be no symtpoms at all in patients that are older or immune compromised. Treatment involves alleviating the source of infection with the use of antibiotics, usually through IV route. In some cases, Osteomyelitis may require portions of the infected bone to be removed that has . If you develop worsening symptoms of pain, fever, warmth/redness, please consult your PCP or go to your emergency department.  It is veru important that you follow up with your doctors after discharge to ensure you remain healthy and your kidneys and infection are all improving. Please follow up with your PCP, Podiatry, and Infectious Disease within 1-2 weeks.         SECONDARY DISCHARGE DIAGNOSES  Diagnosis: VIRAL (acute kidney injury)  Assessment and Plan of Treatment: During this hospital admission you were found to have acute kidney injury. Acute kidney injury (VIRAL) is a sudden episode of kidney failure or kidney damage that happens within a few hours or a few days. VIRAL causes a build-up of waste products in your blood and makes it hard for your kidneys to keep the right balance of fluid in your body. Your kidney numbers have been improving. Please follow up with your primary care doctor for further evaluation and testing. We believe that this was probably caused by some of the antibiotics used to clear your infection causing toxicity in your kidneys. They were stopped, and your kidney function began to improve, as the antibiotics such as Vancomycin also became cleared from your system.      Diagnosis: Diabetes mellitus with hypoglycemia  Assessment and Plan of Treatment: You have a known history of diabetes mellitus prior to your admission. This condition results from blood sugar levels getting too high because your body is more resistant to insulin. Uncontrolled blood sugar levels can lead to kidney and heart damage, pain/numbness/paralysis in your hands and feet, and increased rates of infections.  To manage this you were on medications in the hospital called Lantus and also pre-meal insulin. It is important that you continue to take the appropriate medications when you are discharged so that you can continue to control your blood sugar levels. Additionally be sure to follow up with your primary care physician, podiatrist, and ophthalmologist on a regular basis.  It is important to maintain good control of your blood sugars because keeping them under tighter control helps with healing infection and your recovery. It is important for the future that you follow with your PCP to keep a close eye on your blood sugars.       PRINCIPAL DISCHARGE DIAGNOSIS  Diagnosis: Acute osteomyelitis  Assessment and Plan of Treatment: While you were here you were diagnosed and treated for osteomyelitis which you were treated for with the antibiotics Daptomycin and Cefepime through IV. You will continue on oral antibiotics Ciprofloxacin 750 mg every 12 hours, Linezolid 600mg every 12 hours, Cefpodoxime 400 every 12 hours until . Osteomyelitis is an infection of bone that can be associated with inflammation as well. This infection can reach a bone by traveling through the bloodstream or by spreading from nearby tissue. Infection can also begin in the bone itself if an injury exposes the bone to germs. Symptoms of osteomyelitis include fever, swelling, warmth/redness in the area, pain, and fatigue. Sometimes there may be no symtpoms at all in patients that are older or immune compromised. Treatment involves alleviating the source of infection with the use of antibiotics, usually through IV route. In some cases, Osteomyelitis may require portions of the infected bone to be removed that has . If you develop worsening symptoms of pain, fever, warmth/redness, please consult your PCP or go to your emergency department.  It is veru important that you follow up with your doctors after discharge to ensure you remain healthy and your kidneys and infection are all improving. Please follow up with your PCP, Podiatry, and Infectious Disease within 1-2 weeks.         SECONDARY DISCHARGE DIAGNOSES  Diagnosis: VIRAL (acute kidney injury)  Assessment and Plan of Treatment: During this hospital admission you were found to have acute kidney injury. Acute kidney injury (VIRAL) is a sudden episode of kidney failure or kidney damage that happens within a few hours or a few days. VIRAL causes a build-up of waste products in your blood and makes it hard for your kidneys to keep the right balance of fluid in your body. Your kidney numbers have been improving. Please follow up with your primary care doctor for further evaluation and testing. We believe that this was probably caused by some of the antibiotics used to clear your infection causing toxicity in your kidneys. They were stopped, and your kidney function began to improve, as the antibiotics such as Vancomycin also became cleared from your system.      Diagnosis: Diabetes mellitus with hypoglycemia  Assessment and Plan of Treatment: You have a known history of diabetes mellitus prior to your admission. This condition results from blood sugar levels getting too high because your body is more resistant to insulin. Uncontrolled blood sugar levels can lead to kidney and heart damage, pain/numbness/paralysis in your hands and feet, and increased rates of infections.  To manage this you were on medications in the hospital called Lantus and also pre-meal insulin. It is important that you continue to take the appropriate medications when you are discharged so that you can continue to control your blood sugar levels. Additionally be sure to follow up with your primary care physician, podiatrist, and ophthalmologist on a regular basis.  It is important to maintain good control of your blood sugars because keeping them under tighter control helps with healing infection and your recovery. It is important for the future that you follow with your PCP to keep a close eye on your blood sugars.      Diagnosis: Encounter for wound care  Assessment and Plan of Treatment: It is important that you care for your wound after discharge. Here are our recommendations.     PRINCIPAL DISCHARGE DIAGNOSIS  Diagnosis: Acute osteomyelitis  Assessment and Plan of Treatment: While you were here you were diagnosed and treated for osteomyelitis which you were treated for with the antibiotics Daptomycin and Cefepime through IV. You will continue on oral antibiotics Ciprofloxacin 750 mg every 12 hours, Linezolid 600mg every 12 hours, Cefpodoxime 400 every 12 hours until . Osteomyelitis is an infection of bone that can be associated with inflammation as well. This infection can reach a bone by traveling through the bloodstream or by spreading from nearby tissue. Infection can also begin in the bone itself if an injury exposes the bone to germs. Symptoms of osteomyelitis include fever, swelling, warmth/redness in the area, pain, and fatigue. Sometimes there may be no symtpoms at all in patients that are older or immune compromised. Treatment involves alleviating the source of infection with the use of antibiotics, usually through IV route. In some cases, Osteomyelitis may require portions of the infected bone to be removed that has . If you develop worsening symptoms of pain, fever, warmth/redness, please consult your PCP or go to your emergency department.  You will continue on the following antibiotics: Amoxicllin 875mg every 12 hours, Ciprofloxacin 750 every 12 hours, Cefpodoxime 400 every 12 hours until 23  It is very important that you follow up with your doctors after discharge to ensure you remain healthy and your kidneys and infection are all improving. Please follow up with your PCP, Podiatry, and Infectious Disease within 1-2 weeks.         SECONDARY DISCHARGE DIAGNOSES  Diagnosis: VIRAL (acute kidney injury)  Assessment and Plan of Treatment: During this hospital admission you were found to have acute kidney injury. Acute kidney injury (VIRAL) is a sudden episode of kidney failure or kidney damage that happens within a few hours or a few days. VIRAL causes a build-up of waste products in your blood and makes it hard for your kidneys to keep the right balance of fluid in your body. Your kidney numbers have been improving. Please follow up with your primary care doctor for further evaluation and testing. We believe that this was probably caused by some of the antibiotics used to clear your infection causing toxicity in your kidneys. They were stopped, and your kidney function began to improve, as the antibiotics such as Vancomycin also became cleared from your system.      Diagnosis: Diabetes mellitus with hypoglycemia  Assessment and Plan of Treatment: You have a known history of diabetes mellitus prior to your admission. This condition results from blood sugar levels getting too high because your body is more resistant to insulin. Uncontrolled blood sugar levels can lead to kidney and heart damage, pain/numbness/paralysis in your hands and feet, and increased rates of infections.  To manage this you were on medications in the hospital called Lantus and also pre-meal insulin. It is important that you continue to take the appropriate medications when you are discharged so that you can continue to control your blood sugar levels. Additionally be sure to follow up with your primary care physician, podiatrist, and ophthalmologist on a regular basis.  It is important to maintain good control of your blood sugars because keeping them under tighter control helps with healing infection and your recovery. It is important for the future that you follow with your PCP to keep a close eye on your blood sugars.      Diagnosis: Encounter for wound care  Assessment and Plan of Treatment: It is important that you care for your wound after discharge. Here are our recommendations.     PRINCIPAL DISCHARGE DIAGNOSIS  Diagnosis: Acute osteomyelitis  Assessment and Plan of Treatment: While you were here you were diagnosed and treated for osteomyelitis which you were treated for with the antibiotics Daptomycin and Cefepime through IV. You will continue on oral antibiotics Ciprofloxacin 750 mg every 12 hours, Linezolid 600mg every 12 hours, Cefpodoxime 400 every 12 hours until . Osteomyelitis is an infection of bone that can be associated with inflammation as well. This infection can reach a bone by traveling through the bloodstream or by spreading from nearby tissue. Infection can also begin in the bone itself if an injury exposes the bone to germs. Symptoms of osteomyelitis include fever, swelling, warmth/redness in the area, pain, and fatigue. Sometimes there may be no symtpoms at all in patients that are older or immune compromised. Treatment involves alleviating the source of infection with the use of antibiotics, usually through IV route. In some cases, Osteomyelitis may require portions of the infected bone to be removed that has . If you develop worsening symptoms of pain, fever, warmth/redness, please consult your PCP or go to your emergency department.  You will continue on the following antibiotics: Amoxicllin 875mg every 12 hours, Ciprofloxacin 750 every 12 hours, Cefpodoxime 400 every 12 hours until 23  It is very important that you follow up with your doctors after discharge to ensure you remain healthy and your kidneys and infection are all improving.   Please follow up with your PCP Dr. Antony on  at 11:20 AM.  Please follow up with Infectious Disease within 1-2 weeks. They will call you with your appointment.  Please follow up with Podiatry within 1-2 weeks. (details below for Dr. Marquez)  Please follow up with Dr. Ortega Fernandez within 1 week of discharge.  Office information:   -[place] Address- [address], [place] [address] Phone: [phone]  -[place] Address- [address], [place] [address] Phone: [phone]        SECONDARY DISCHARGE DIAGNOSES  Diagnosis: VIRAL (acute kidney injury)  Assessment and Plan of Treatment: During this hospital admission you were found to have acute kidney injury. Acute kidney injury (VIRAL) is a sudden episode of kidney failure or kidney damage that happens within a few hours or a few days. VIRAL causes a build-up of waste products in your blood and makes it hard for your kidneys to keep the right balance of fluid in your body. Your kidney numbers have been improving. Please follow up with your primary care doctor for further evaluation and testing. We believe that this was probably caused by some of the antibiotics used to clear your infection causing toxicity in your kidneys. They were stopped, and your kidney function began to improve, as the antibiotics such as Vancomycin also became cleared from your system.      Diagnosis: Diabetes mellitus with hypoglycemia  Assessment and Plan of Treatment: You have a known history of diabetes mellitus prior to your admission. This condition results from blood sugar levels getting too high because your body is more resistant to insulin. Uncontrolled blood sugar levels can lead to kidney and heart damage, pain/numbness/paralysis in your hands and feet, and increased rates of infections.  To manage this you were on medications in the hospital called Lantus and also pre-meal insulin. It is important that you continue to take the appropriate medications when you are discharged so that you can continue to control your blood sugar levels. Additionally be sure to follow up with your primary care physician, podiatrist, and ophthalmologist on a regular basis.  It is important to maintain good control of your blood sugars because keeping them under tighter control helps with healing infection and your recovery. It is important for the future that you follow with your PCP to keep a close eye on your blood sugars.      Diagnosis: Encounter for wound care  Assessment and Plan of Treatment: It is important that you care for your wound after discharge. Here are our recommendations.     PRINCIPAL DISCHARGE DIAGNOSIS  Diagnosis: Acute osteomyelitis  Assessment and Plan of Treatment: While you were here you were diagnosed and treated for osteomyelitis which you were treated for with the antibiotics Daptomycin and Cefepime through IV. You will continue on oral antibiotics Ciprofloxacin 750 mg every 12 hours, Linezolid 600mg every 12 hours, Cefpodoxime 400 every 12 hours until . Osteomyelitis is an infection of bone that can be associated with inflammation as well. This infection can reach a bone by traveling through the bloodstream or by spreading from nearby tissue. Infection can also begin in the bone itself if an injury exposes the bone to germs. Symptoms of osteomyelitis include fever, swelling, warmth/redness in the area, pain, and fatigue. Sometimes there may be no symtpoms at all in patients that are older or immune compromised. Treatment involves alleviating the source of infection with the use of antibiotics, usually through IV route. In some cases, Osteomyelitis may require portions of the infected bone to be removed that has . If you develop worsening symptoms of pain, fever, warmth/redness, please consult your PCP or go to your emergency department.  You will continue on the following antibiotics: Amoxicllin 875mg every 12 hours, Ciprofloxacin 750 every 12 hours, Cefpodoxime 400 every 12 hours until 23  It is very important that you follow up with your doctors after discharge to ensure you remain healthy and your kidneys and infection are all improving.   Please follow up with your PCP Dr. Antony on  at 11:20 AM.  Please follow up with Infectious Disease within 1-2 weeks. They will call you with your appointment.  Please follow up with Podiatry within 1-2 weeks. (details below for Dr. Marquez)  Please follow up with Dr. Ortega Fernandez within 1 week of discharge.  Office information:   -[place] Address- [address], [place] [address] Phone: [phone]  -[place] Address- [address] [address] Phone: [phone]        SECONDARY DISCHARGE DIAGNOSES  Diagnosis: VIRAL (acute kidney injury)  Assessment and Plan of Treatment: During this hospital admission you were found to have acute kidney injury. Acute kidney injury (VIRAL) is a sudden episode of kidney failure or kidney damage that happens within a few hours or a few days. VIRAL causes a build-up of waste products in your blood and makes it hard for your kidneys to keep the right balance of fluid in your body. Your kidney numbers have been improving. Please follow up with your primary care doctor for further evaluation and testing. We believe that this was probably caused by some of the antibiotics used to clear your infection causing toxicity in your kidneys. They were stopped, and your kidney function began to improve, as the antibiotics such as Vancomycin also became cleared from your system.      Diagnosis: Diabetes mellitus with hypoglycemia  Assessment and Plan of Treatment: You have a known history of diabetes mellitus prior to your admission. This condition results from blood sugar levels getting too high because your body is more resistant to insulin. Uncontrolled blood sugar levels can lead to kidney and heart damage, pain/numbness/paralysis in your hands and feet, and increased rates of infections.  To manage this you were on medications in the hospital called Lantus and also pre-meal insulin. It is important that you continue to take the appropriate medications when you are discharged so that you can continue to control your blood sugar levels. Additionally be sure to follow up with your primary care physician, podiatrist, and ophthalmologist on a regular basis.  It is important to maintain good control of your blood sugars because keeping them under tighter control helps with healing infection and your recovery. It is important for the future that you follow with your PCP to keep a close eye on your blood sugars.      Diagnosis: Encounter for wound care  Assessment and Plan of Treatment: It is important that you care for your wound after discharge. Here are our recommendations.  Discharge dressing/offloading instructions: Cleanse wound with normal saline daily, pat dry with sterile guaze, apply betadine soaked gauze to wound base, cover with dry sterile gauze, wrap with Kerlix. You are to be Heel Weight-bearing as tolerated. In other words, please stay COMPLETELY off right heel upon discharge. This is important because this is a chronic wound and in order for it to heal completely, there must be NO pressure on it and you must remain completely off your right heel.       PRINCIPAL DISCHARGE DIAGNOSIS  Diagnosis: Acute osteomyelitis  Assessment and Plan of Treatment: While you were here you were diagnosed and treated for osteomyelitis which you were treated for with the antibiotics Daptomycin and Cefepime through IV. You will continue on oral antibiotics Ciprofloxacin 750 mg every 12 hours, Linezolid 600mg every 12 hours, Cefpodoxime 400 every 12 hours until . Osteomyelitis is an infection of bone that can be associated with inflammation as well. This infection can reach a bone by traveling through the bloodstream or by spreading from nearby tissue. Infection can also begin in the bone itself if an injury exposes the bone to germs. Symptoms of osteomyelitis include fever, swelling, warmth/redness in the area, pain, and fatigue. Sometimes there may be no symtpoms at all in patients that are older or immune compromised. Treatment involves alleviating the source of infection with the use of antibiotics, usually through IV route. In some cases, Osteomyelitis may require portions of the infected bone to be removed that has . If you develop worsening symptoms of pain, fever, warmth/redness, please consult your PCP or go to your emergency department.  You will continue on the following antibiotics: Amoxicllin 875mg every 12 hours, Ciprofloxacin 750 every 12 hours, Cefpodoxime 400 every 12 hours until 23  It is very important that you follow up with your doctors after discharge to ensure you remain healthy and your kidneys and infection are all improving.   Please follow up with your PCP Dr. Antony on  at 11:20 AM.  Please follow up with Infectious Disease within 1-2 weeks. They will call you with your appointment.  Please follow up with Podiatry within 1-2 weeks. (details below for Dr. Marquez)  Please follow up with Dr. Ortega Fernandez within 1 week of discharge.  Office information:   -[place] Address- [address], [place] [address] Phone: [phone]  -[place] Address- [address], [place] [address] Phone: [phone]        SECONDARY DISCHARGE DIAGNOSES  Diagnosis: VIRAL (acute kidney injury)  Assessment and Plan of Treatment: During this hospital admission you were found to have acute kidney injury. Acute kidney injury (VIRAL) is a sudden episode of kidney failure or kidney damage that happens within a few hours or a few days. VIRAL causes a build-up of waste products in your blood and makes it hard for your kidneys to keep the right balance of fluid in your body. Your kidney numbers have been improving. Please follow up with your primary care doctor for further evaluation and testing. We believe that this was probably caused by some of the antibiotics used to clear your infection causing toxicity in your kidneys. They were stopped, and your kidney function began to improve, as the antibiotics such as Vancomycin also became cleared from your system.      Diagnosis: Diabetes mellitus with hypoglycemia  Assessment and Plan of Treatment: You have a known history of diabetes mellitus prior to your admission. This condition results from blood sugar levels getting too high because your body is more resistant to insulin. Uncontrolled blood sugar levels can lead to kidney and heart damage, pain/numbness/paralysis in your hands and feet, and increased rates of infections.  To manage this you were on medications in the hospital called Lantus and also pre-meal insulin. It is important that you continue to take the appropriate medications when you are discharged so that you can continue to control your blood sugar levels. Additionally be sure to follow up with your primary care physician, podiatrist, and ophthalmologist on a regular basis.  It is important to maintain good control of your blood sugars because keeping them under tighter control helps with healing infection and your recovery. It is important for the future that you follow with your PCP to keep a close eye on your blood sugars.      Diagnosis: Encounter for wound care  Assessment and Plan of Treatment: It is important that you care for your wound after discharge. Here are our recommendations.  Discharge dressing/offloading instructions: Cleanse wound with normal saline daily, pat dry with sterile guaze, apply betadine soaked gauze to wound base, cover with dry sterile gauze, wrap with Kerlix. You are to be Heel Weight-bearing as tolerated. In other words, please stay COMPLETELY off wound on right toe upon discharge. This is important because this is a chronic wound and in order for it to heal completely, there must be NO pressure on it and you must remain completely off the wound.

## 2023-08-02 NOTE — DISCHARGE NOTE NURSING/CASE MANAGEMENT/SOCIAL WORK - NSDCPEFALRISK_GEN_ALL_CORE
For information on Fall & Injury Prevention, visit: [email or web address] OR  [email or web address] OR  https://www.cdc.gov/steadi/patient.html

## 2023-08-02 NOTE — PROGRESS NOTE ADULT - NS ATTEND AMEND GEN_ALL_CORE FT
#OM of R foot, HIV    Patient feels well.  Awaiting bone biopsy result.  WCx growing PsA, Enterobacter cloacae complex and E faecalis, susceptibility pending.  Cont IV dapto 900mg q24h and cefepime 2g IV q8h.  Cont Biktarvy.  Final rec pending culture and bone biopsy result.    Team 2 will follow you.  Case d/w primary team.    Stephanie Olmos MD, MS  Infectious Disease attending  work cell [phone]   For any questions during evening/weekend/holiday, please page ID on call
#wound infection/cellulitis of R foot, HIV    Path showed negative for OM - I discussed with pathology attending Dr. Soto, bone all looks good w/o inflammation and fragment of necrotic bone is likely from prior infection.  WCx grew PsA, Enterobacter cloacae complex and E. faecalis.  Bone biopsy culture grew Corynebacterium striatum - unclear significance.  Will treat with total 10 days of abx for SSTI.  Discharge patient with Augmentin 875mg PO q12h (for Coryne and E. faecalis), Cipro 750mg PO q12h (for PsA) and cefpodoxime 400mg PO q12h (for Enterobacter).  End date of abx is 8/6.  - Patient can follow up with me in 1 week ([address] [place] [address], [phone]), ID office will call patient to schedule       Thank you for your consult.  Please re-consult us or call us with questions.  Case d/w primary team.    Stephanie Olmos MD, MS  Infectious Disease attending  work cell [phone]  For any questions during evening/weekend/holiday, please page ID on call

## 2023-08-02 NOTE — PROGRESS NOTE ADULT - ASSESSMENT
48M with HIV (compliant with Biktarvy- VLUD and CD4 368 last checked in Jan), DM, HTN, partial great toe amputation in Jan 2022, recent R great toe OM in April (s/p 6 week course of CTX and Daptomycin) who presents for a 1 day history of right foot pain and swelling found to SSTI of R first phalanx. Surgical swab cx growing PsA, E. faecalis, Enterobacter cloacae complex. Bone bx obtained by podiatry 7/31 shows no evidence of acute or chronic OM. R halux tissue culture growing corynebacterium- this is likely contaminant but will cover with Linezolid as we are planning to treat with shorter course of antibiotics.      Suggest:  -Can discharge patient with ciprofloxacin 750 mg PO Q12 (coverage of PsA) plus Linezolid 600 mg PO Q12 (coverage of Corynebacterium and E. faecalis) plus cefpodoxime 400 mg PO Q12h (coverage of Enterobacter cloacae)      -duration (7/28-8/6) for total 10d course   -Cont Biktarvy 1 tab daily   - Patient to follow up with Dr. Olmos next week ([address] [place] [address], [phone]), ID office will call patient to schedule       Team 2 signing off. Thank you for your consultation   Please reconsult with questions   Case d/w primary team.  Final recommendation pending attending note.    Mary Lou Hernandez, Infectious Diseases PA  Please reach out for any questions 9 am-5pm. For evenings and weekends, please call the ID physician on call.  Work cell: [phone]

## 2023-08-02 NOTE — PROGRESS NOTE ADULT - REASON FOR ADMISSION
right foot pain
OM
right foot pain
right hallux osteomyelitis

## 2023-08-02 NOTE — PROGRESS NOTE ADULT - PROBLEM SELECTOR PLAN 1
Pt presenting with a 1 day history of swelling and pain to the R toe. Reports hx of OM of R great toe (has 3 month history of open wound to the toe) and completed 6 week course of antibiotics mid June (Vanc/Cefepime). Followed with a podiatrist, who reported improvement per x-ray, however 2 weeks ago again started to have purulence/swelling and was given 5 day course of Bactrim. s/p Vanc and Zosyn in the ED - XR concerning for OM. MRI 7/29 revealed Plantar skin wound with soft tissue tract extending to the proximal phalanx at the great toe and Rim-enhancing fluid within the tract concerning for infection. Per MRI findings, podiatry recommended surgery however patient strongly rejects surgical intervention and wants medical optimization only. ID will be consulted for recommendations for medical treatment.      -tissue cultures (7/30): rare corynebacteium striatum   -Pseudomonas, Sensitivity=4 to Cefepime  -bone biopsy (7/31): Reactive bone and small fragments of necrotic bone with reactive  fibrous tissue, No acute osteomyelitis identified  -did not meet SIRS criteria on admission  -dc Vanc and Zosyn 7/31 due to VIRAL as below  -Day 3 Daptomycin 900 mg IV Q24h. Check CK.  -Day 3 Cefepime 2g IV Q12h  -Follow up ID recs Pt presenting with a 1 day history of swelling and pain to the R toe. Reports hx of OM of R great toe (has 3 month history of open wound to the toe) and completed 6 week course of antibiotics mid June (Vanc/Cefepime). Followed with a podiatrist, who reported improvement per x-ray, however 2 weeks ago again started to have purulence/swelling and was given 5 day course of Bactrim. s/p Vanc and Zosyn in the ED - XR concerning for OM. MRI 7/29 revealed Plantar skin wound with soft tissue tract extending to the proximal phalanx at the great toe and Rim-enhancing fluid within the tract concerning for infection. Per MRI findings, podiatry recommended surgery however patient strongly rejects surgical intervention and wants medical optimization only. ID will be consulted for recommendations for medical treatment.      -tissue cultures (7/30): rare corynebacteium striatum   -Pseudomonas, Sensitivity=4 to Cefepime  -bone biopsy (7/31): Reactive bone and small fragments of necrotic bone with reactive  fibrous tissue, No acute osteomyelitis identified  -did not meet SIRS criteria on admission  -dc Vanc and Zosyn 7/31 due to VIRAL as below  -Day 3 Daptomycin 900 mg IV Q24h. Check CK.  -Day 3 Cefepime 2g IV Q12h  -Follow up ID recs, transition to PO meds for above regimen  -Stressed importance of good follow-up after discharge with PCP, podiatry, and ID. Pt presenting with a 1 day history of swelling and pain to the R toe. Reports hx of OM of R great toe (has 3 month history of open wound to the toe) and completed 6 week course of antibiotics mid June (Vanc/Cefepime). Followed with a podiatrist, who reported improvement per x-ray, however 2 weeks ago again started to have purulence/swelling and was given 5 day course of Bactrim. s/p Vanc and Zosyn in the ED - XR concerning for OM. MRI 7/29 revealed Plantar skin wound with soft tissue tract extending to the proximal phalanx at the great toe and Rim-enhancing fluid within the tract concerning for infection. Per MRI findings, podiatry recommended surgery however patient strongly rejects surgical intervention and wants medical optimization only. ID will be consulted for recommendations for medical treatment.      -preliminary tissue cultures (7/30): rare corynebacteium striatum   -Pseudomonas, Sensitivity=4 to Cefepime  -bone biopsy (7/31): Reactive bone and small fragments of necrotic bone with reactive  fibrous tissue, No acute osteomyelitis identified  -f/u final tissue culture read  -did not meet SIRS criteria on admission  -dc Vanc and Zosyn 7/31 due to VIRAL as below  -Day 3 Daptomycin 900 mg IV Q24h. Check CK.  -Day 3 Cefepime 2g IV Q12h  -Follow up ID recs, transition to PO meds for above regimen  -Stressed importance of good follow-up after discharge with PCP, podiatry, and ID.

## 2023-08-02 NOTE — PROGRESS NOTE ADULT - SUBJECTIVE AND OBJECTIVE BOX
INFECTIOUS DISEASES CONSULT FOLLOW-UP NOTE    INTERVAL HPI/OVERNIGHT EVENTS:      ROS:   Constitutional, eyes, ENT, cardiovascular, respiratory, gastrointestinal, genitourinary, integumentary, neurological, psychiatric and heme/lymph are otherwise negative other than noted above       ANTIBIOTICS/RELEVANT:    MEDICATIONS  (STANDING):  aspirin  chewable 81 milliGRAM(s) Oral every 24 hours  bictegravir 50 mG/emtricitabine 200 mG/tenofovir alafenamide 25 mG (BIKTARVY) 1 Tablet(s) Oral every 24 hours  cefepime   IVPB 2000 milliGRAM(s) IV Intermittent every 12 hours  DAPTOmycin IVPB 900 milliGRAM(s) IV Intermittent every 24 hours  dextrose 5%. 1000 milliLiter(s) (50 mL/Hr) IV Continuous <Continuous>  dextrose 5%. 1000 milliLiter(s) (100 mL/Hr) IV Continuous <Continuous>  dextrose 50% Injectable 25 Gram(s) IV Push once  dextrose 50% Injectable 12.5 Gram(s) IV Push once  dextrose 50% Injectable 25 Gram(s) IV Push once  glucagon  Injectable 1 milliGRAM(s) IntraMuscular once  heparin   Injectable 5000 Unit(s) SubCutaneous every 8 hours  insulin glargine Injectable (LANTUS) 35 Unit(s) SubCutaneous at bedtime  insulin lispro (ADMELOG) corrective regimen sliding scale   SubCutaneous Before meals and at bedtime  insulin lispro Injectable (ADMELOG) 2 Unit(s) SubCutaneous three times a day before meals  polyethylene glycol 3350 17 Gram(s) Oral two times a day    MEDICATIONS  (PRN):  acetaminophen     Tablet .. 650 milliGRAM(s) Oral every 6 hours PRN Mild Pain (1 - 3)  dextrose Oral Gel 15 Gram(s) Oral once PRN Blood Glucose LESS THAN 70 milliGRAM(s)/deciliter        Vital Signs Last 24 Hrs  T(C): 36.8 (02 Aug 2023 09:26), Max: 37 (02 Aug 2023 05:53)  T(F): 98.2 (02 Aug 2023 09:26), Max: 98.6 (02 Aug 2023 05:53)  HR: 80 (02 Aug 2023 09:26) (80 - 84)  BP: 131/77 (02 Aug 2023 09:26) (131/77 - 158/91)  BP(mean): --  RR: 18 (02 Aug 2023 09:26) (17 - 20)  SpO2: 97% (02 Aug 2023 09:26) (97% - 98%)    Parameters below as of 02 Aug 2023 09:26  Patient On (Oxygen Delivery Method): room air        PHYSICAL EXAM:  Constitutional: alert, NAD  Eyes: the sclera and conjunctiva were normal.   ENT: the ears and nose were normal in appearance.   Neck: the appearance of the neck was normal and the neck was supple.   Pulmonary: no respiratory distress and lungs were clear to auscultation bilaterally.   Heart: heart rate was normal and rhythm regular, normal S1 and S2  Vascular:. there was no peripheral edema  Abdomen: normal bowel sounds, soft, non-tender  Neurological: no focal deficits.   Psychiatric: the affect was normal        LABS:                        12.1   5.36  )-----------( 247      ( 02 Aug 2023 05:30 )             35.4     08-02    143  |  110<H>  |  29<H>  ----------------------------<  91  4.2   |  22  |  2.30<H>    Ca    9.0      02 Aug 2023 05:30  Phos  3.9     08-02  Mg     2.2     08-02        Urinalysis Basic - ( 02 Aug 2023 05:30 )    Color: x / Appearance: x / SG: x / pH: x  Gluc: 91 mg/dL / Ketone: x  / Bili: x / Urobili: x   Blood: x / Protein: x / Nitrite: x   Leuk Esterase: x / RBC: x / WBC x   Sq Epi: x / Non Sq Epi: x / Bacteria: x        MICROBIOLOGY:      RADIOLOGY & ADDITIONAL STUDIES:  Reviewed INFECTIOUS DISEASES CONSULT FOLLOW-UP NOTE    INTERVAL HPI/OVERNIGHT EVENTS:    Patient seen and examined at bedside. KAYLEE. Patient denies complaints       ROS:   Constitutional, eyes, ENT, cardiovascular, respiratory, gastrointestinal, genitourinary, integumentary, neurological, psychiatric and heme/lymph are otherwise negative other than noted above       ANTIBIOTICS/RELEVANT:    MEDICATIONS  (STANDING):  aspirin  chewable 81 milliGRAM(s) Oral every 24 hours  bictegravir 50 mG/emtricitabine 200 mG/tenofovir alafenamide 25 mG (BIKTARVY) 1 Tablet(s) Oral every 24 hours  cefepime   IVPB 2000 milliGRAM(s) IV Intermittent every 12 hours  DAPTOmycin IVPB 900 milliGRAM(s) IV Intermittent every 24 hours  dextrose 5%. 1000 milliLiter(s) (50 mL/Hr) IV Continuous <Continuous>  dextrose 5%. 1000 milliLiter(s) (100 mL/Hr) IV Continuous <Continuous>  dextrose 50% Injectable 25 Gram(s) IV Push once  dextrose 50% Injectable 12.5 Gram(s) IV Push once  dextrose 50% Injectable 25 Gram(s) IV Push once  glucagon  Injectable 1 milliGRAM(s) IntraMuscular once  heparin   Injectable 5000 Unit(s) SubCutaneous every 8 hours  insulin glargine Injectable (LANTUS) 35 Unit(s) SubCutaneous at bedtime  insulin lispro (ADMELOG) corrective regimen sliding scale   SubCutaneous Before meals and at bedtime  insulin lispro Injectable (ADMELOG) 2 Unit(s) SubCutaneous three times a day before meals  polyethylene glycol 3350 17 Gram(s) Oral two times a day    MEDICATIONS  (PRN):  acetaminophen     Tablet .. 650 milliGRAM(s) Oral every 6 hours PRN Mild Pain (1 - 3)  dextrose Oral Gel 15 Gram(s) Oral once PRN Blood Glucose LESS THAN 70 milliGRAM(s)/deciliter        Vital Signs Last 24 Hrs  T(C): 36.8 (02 Aug 2023 09:26), Max: 37 (02 Aug 2023 05:53)  T(F): 98.2 (02 Aug 2023 09:26), Max: 98.6 (02 Aug 2023 05:53)  HR: 80 (02 Aug 2023 09:26) (80 - 84)  BP: 131/77 (02 Aug 2023 09:26) (131/77 - 158/91)  BP(mean): --  RR: 18 (02 Aug 2023 09:26) (17 - 20)  SpO2: 97% (02 Aug 2023 09:26) (97% - 98%)    Parameters below as of 02 Aug 2023 09:26  Patient On (Oxygen Delivery Method): room air        PHYSICAL EXAM:  Constitutional: alert, NAD  Eyes: the sclera and conjunctiva were normal.   ENT: the ears and nose were normal in appearance.   Neck: the appearance of the neck was normal and the neck was supple.   Pulmonary: no respiratory distress and lungs were clear to auscultation bilaterally.   Heart: heart rate was normal and rhythm regular, normal S1 and S2  Vascular:. there was no peripheral edema  Abdomen: normal bowel sounds, soft, non-tender  Extremities: R foot wrapped   Neurological: no focal deficits.   Psychiatric: the affect was normal        LABS:                        12.1   5.36  )-----------( 247      ( 02 Aug 2023 05:30 )             35.4     08-02    143  |  110<H>  |  29<H>  ----------------------------<  91  4.2   |  22  |  2.30<H>    Ca    9.0      02 Aug 2023 05:30  Phos  3.9     08-02  Mg     2.2     08-02        Urinalysis Basic - ( 02 Aug 2023 05:30 )    Color: x / Appearance: x / SG: x / pH: x  Gluc: 91 mg/dL / Ketone: x  / Bili: x / Urobili: x   Blood: x / Protein: x / Nitrite: x   Leuk Esterase: x / RBC: x / WBC x   Sq Epi: x / Non Sq Epi: x / Bacteria: x        MICROBIOLOGY:  reviewed     RADIOLOGY & ADDITIONAL STUDIES:  Reviewed

## 2023-08-02 NOTE — PROGRESS NOTE ADULT - PROBLEM SELECTOR PLAN 7
Pt reports hx of diabetes, complicated with diabetic neuropathy  Uses trulicity 1.5 and Basaglar 70u at home  HbA1C 7.2%, Estimated average glucose 160  Patient reports eating well, not skipping meals.   -8/1: Patient's 9/10 PM glucose high on previous several nights 230, 181, 227. Patient usually eats dinner 7:30/8PM. Increased Lispro 4u pre-dinner.  -Continue Lantus 35u  -Continue Lispro 2u BID (pre-breakfast, pre-lunch)  -Continue Lispro 4u pre-dinner   -mISS Pt reports hx of diabetes, complicated with diabetic neuropathy  Uses trulicity 1.5 and Basaglar 70u at home  HbA1C 7.2%, Estimated average glucose 160  Patient reports eating well, not skipping meals.   -8/1: Patient's 9/10 PM glucose high on previous several nights 230, 181, 227. Patient usually eats dinner 7:30/8PM. Increased Lispro 4u pre-dinner.  -8/2: 10:36 PM glucose post-dinner last night 8/1 still elevated at 213.   -Continue Lantus 35u  -Continue Lispro 2u BID (pre-breakfast, pre-lunch)  -Increase Lispro 6u pre-dinner, goal for tight glycemic control glucose <180.  -mISS

## 2023-08-02 NOTE — DISCHARGE NOTE PROVIDER - CARE PROVIDER_API CALL
Ortega Fernandez  Podiatric Medicine and Surgery  [address], [address]  [place], [place] [address]  Phone: [phone]  Fax: [phone]  Follow Up Time:

## 2023-08-02 NOTE — DISCHARGE NOTE NURSING/CASE MANAGEMENT/SOCIAL WORK - PATIENT PORTAL LINK FT
You can access the FollowMyHealth Patient Portal offered by [facility] by registering at the following website: [email or web address]. By joining [facility]’s FollowMyHealth portal, you will also be able to view your health information using other applications (apps) compatible with our system.

## 2023-08-02 NOTE — PROGRESS NOTE ADULT - PROBLEM SELECTOR PLAN 2
- creatinine 1.66 on admission; Cr worsened from 2.26 to 2.4 today.   - Bladder scan showed 63 mL   - Vanc trough improved from 22.9 7/31 to 13.5 today 8/1.  - UA negative nitrites, leukocyte esterase  - serum Na 140, Serum Cr 2.4, Urine Na 42, Urine Cr 99. FeNa 0.7% suggesting pre-renal etiology.  - intrinsic (vanc and zosyn nephrotoxicity) vs. pre-renal origin due to hypotension in ED   - patient verbalized that on prior admission for OM, inpatient team was monitoring elevated CK levels while on vanc  - baseline Cr prior to hospitalization was 1.5 per PCP (Dr. Dena Ochoa) records from patient online portal  - encourage PO intake  - Trend Cr, f/u BMP daily - creatinine 1.66 on admission; Cr improving from 2.4 to 2.3 today 8/2.  - Bladder scan showed 63 mL   - Vanc trough improved from 22.9 7/31 to 13.5 on 8/1.  - UA negative nitrites, leukocyte esterase  - serum Na 140, Serum Cr 2.4, Urine Na 42, Urine Cr 99. FeNa 0.7% suggesting pre-renal etiology.  - intrinsic (vanc and zosyn nephrotoxicity) vs. pre-renal origin due to hypotension in ED   - patient verbalized that on prior admission for OM, inpatient team was monitoring elevated CK levels while on vanc  - baseline Cr prior to hospitalization was 1.5 per PCP (Dr. Dena Ochoa) records from patient online portal  - encourage PO intake  - Trend Cr, f/u BMP daily

## 2023-08-03 LAB
CULTURE RESULTS: SIGNIFICANT CHANGE UP
SPECIMEN SOURCE: SIGNIFICANT CHANGE UP

## 2023-08-03 PROCEDURE — 86140 C-REACTIVE PROTEIN: CPT

## 2023-08-03 PROCEDURE — 97165 OT EVAL LOW COMPLEX 30 MIN: CPT

## 2023-08-03 PROCEDURE — 73630 X-RAY EXAM OF FOOT: CPT

## 2023-08-03 PROCEDURE — 85652 RBC SED RATE AUTOMATED: CPT

## 2023-08-03 PROCEDURE — 80202 ASSAY OF VANCOMYCIN: CPT

## 2023-08-03 PROCEDURE — 87070 CULTURE OTHR SPECIMN AEROBIC: CPT

## 2023-08-03 PROCEDURE — 81003 URINALYSIS AUTO W/O SCOPE: CPT

## 2023-08-03 PROCEDURE — 88307 TISSUE EXAM BY PATHOLOGIST: CPT

## 2023-08-03 PROCEDURE — 83935 ASSAY OF URINE OSMOLALITY: CPT

## 2023-08-03 PROCEDURE — A9585: CPT

## 2023-08-03 PROCEDURE — 84100 ASSAY OF PHOSPHORUS: CPT

## 2023-08-03 PROCEDURE — 36415 COLL VENOUS BLD VENIPUNCTURE: CPT

## 2023-08-03 PROCEDURE — 97161 PT EVAL LOW COMPLEX 20 MIN: CPT

## 2023-08-03 PROCEDURE — 81001 URINALYSIS AUTO W/SCOPE: CPT

## 2023-08-03 PROCEDURE — 84540 ASSAY OF URINE/UREA-N: CPT

## 2023-08-03 PROCEDURE — 97116 GAIT TRAINING THERAPY: CPT

## 2023-08-03 PROCEDURE — 99285 EMERGENCY DEPT VISIT HI MDM: CPT

## 2023-08-03 PROCEDURE — 73719 MRI LOWER EXTREMITY W/DYE: CPT

## 2023-08-03 PROCEDURE — 84156 ASSAY OF PROTEIN URINE: CPT

## 2023-08-03 PROCEDURE — 87186 SC STD MICRODIL/AGAR DIL: CPT

## 2023-08-03 PROCEDURE — 97530 THERAPEUTIC ACTIVITIES: CPT

## 2023-08-03 PROCEDURE — 87075 CULTR BACTERIA EXCEPT BLOOD: CPT

## 2023-08-03 PROCEDURE — 80053 COMPREHEN METABOLIC PANEL: CPT

## 2023-08-03 PROCEDURE — 85025 COMPLETE CBC W/AUTO DIFF WBC: CPT

## 2023-08-03 PROCEDURE — 84133 ASSAY OF URINE POTASSIUM: CPT

## 2023-08-03 PROCEDURE — 82962 GLUCOSE BLOOD TEST: CPT

## 2023-08-03 PROCEDURE — 83036 HEMOGLOBIN GLYCOSYLATED A1C: CPT

## 2023-08-03 PROCEDURE — 83735 ASSAY OF MAGNESIUM: CPT

## 2023-08-03 PROCEDURE — 82550 ASSAY OF CK (CPK): CPT

## 2023-08-03 PROCEDURE — 80048 BASIC METABOLIC PNL TOTAL CA: CPT

## 2023-08-03 PROCEDURE — 93971 EXTREMITY STUDY: CPT

## 2023-08-03 PROCEDURE — 88311 DECALCIFY TISSUE: CPT

## 2023-08-03 PROCEDURE — 84300 ASSAY OF URINE SODIUM: CPT

## 2023-08-03 PROCEDURE — 82570 ASSAY OF URINE CREATININE: CPT

## 2023-08-03 PROCEDURE — 87184 SC STD DISK METHOD PER PLATE: CPT

## 2023-08-03 PROCEDURE — 83605 ASSAY OF LACTIC ACID: CPT

## 2023-08-03 PROCEDURE — 84550 ASSAY OF BLOOD/URIC ACID: CPT

## 2023-08-09 PROBLEM — M10.9 GOUT, UNSPECIFIED: Chronic | Status: ACTIVE | Noted: 2023-07-27

## 2023-08-09 PROBLEM — B20 HUMAN IMMUNODEFICIENCY VIRUS [HIV] DISEASE: Chronic | Status: ACTIVE | Noted: 2023-07-27

## 2023-08-10 LAB — MISCELLANEOUS TEST NAME: SIGNIFICANT CHANGE UP

## 2023-08-15 ENCOUNTER — APPOINTMENT (OUTPATIENT)
Dept: INFECTIOUS DISEASE | Facility: CLINIC | Age: 48
End: 2023-08-15

## 2023-08-15 DIAGNOSIS — T87.53 NECROSIS OF AMPUTATION STUMP, RIGHT LOWER EXTREMITY: ICD-10-CM

## 2023-08-15 DIAGNOSIS — M86.171 OTHER ACUTE OSTEOMYELITIS, RIGHT ANKLE AND FOOT: ICD-10-CM

## 2023-08-15 DIAGNOSIS — B96.5 PSEUDOMONAS (AERUGINOSA) (MALLEI) (PSEUDOMALLEI) AS THE CAUSE OF DISEASES CLASSIFIED ELSEWHERE: ICD-10-CM

## 2023-08-15 DIAGNOSIS — B95.2 ENTEROCOCCUS AS THE CAUSE OF DISEASES CLASSIFIED ELSEWHERE: ICD-10-CM
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
## 2023-08-15 DIAGNOSIS — Z91.013 ALLERGY TO SEAFOOD: ICD-10-CM

## 2023-08-15 DIAGNOSIS — Z89.421 ACQUIRED ABSENCE OF OTHER RIGHT TOE(S): ICD-10-CM

## 2023-08-15 DIAGNOSIS — E11.40 TYPE 2 DIABETES MELLITUS WITH DIABETIC NEUROPATHY, UNSPECIFIED: ICD-10-CM

## 2023-08-15 DIAGNOSIS — E11.649 TYPE 2 DIABETES MELLITUS WITH HYPOGLYCEMIA WITHOUT COMA: ICD-10-CM

## 2023-08-15 DIAGNOSIS — Z79.4 LONG TERM (CURRENT) USE OF INSULIN: ICD-10-CM

## 2023-08-15 DIAGNOSIS — Z79.82 LONG TERM (CURRENT) USE OF ASPIRIN: ICD-10-CM

## 2023-08-15 DIAGNOSIS — N17.0 ACUTE KIDNEY FAILURE WITH TUBULAR NECROSIS: ICD-10-CM

## 2023-08-15 DIAGNOSIS — Z88.1 ALLERGY STATUS TO OTHER ANTIBIOTIC AGENTS STATUS: ICD-10-CM

## 2023-08-15 DIAGNOSIS — E78.5 HYPERLIPIDEMIA, UNSPECIFIED: ICD-10-CM
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
## 2023-08-15 DIAGNOSIS — Z79.899 OTHER LONG TERM (CURRENT) DRUG THERAPY: ICD-10-CM

## 2023-08-15 DIAGNOSIS — I95.9 HYPOTENSION, UNSPECIFIED: ICD-10-CM

## 2023-08-15 DIAGNOSIS — Z21 ASYMPTOMATIC HUMAN IMMUNODEFICIENCY VIRUS [HIV] INFECTION STATUS: ICD-10-CM

## 2024-03-26 PROBLEM — Z00.00 ENCOUNTER FOR PREVENTIVE HEALTH EXAMINATION: Status: ACTIVE | Noted: 2024-03-26

## 2024-10-24 NOTE — ED PROVIDER NOTE - IV ALTEPLASE EXCL REL HIDDEN
You were seen at Urgent Care today for left ankle pain.  Please treat as discussed. Monitor for red flags which we spoke about, If your symptoms change, worsen or become concerning in any way, please go to the emergency room immediately, otherwise you can followup with your PCP in 2-3 days as needed  
show

## 2025-02-03 NOTE — OCCUPATIONAL THERAPY INITIAL EVALUATION ADULT - PERTINENT HX OF CURRENT PROBLEM, REHAB EVAL
[facility] Consultation / Initial Visit      Today's Date: 2/3/2025    PCP: Wade Puri MD  Referring Provider: Inpatient consult to Cardiology  Consult performed by: Dean Li MD  Consult ordered by: Dustin Rodriguez MD             INDICATION FOR CONSULTATION: Peripheral artery disease, chest pain and patient with known extensive cardiovascular history      HPI:       61 year old male presents with a chief complaint of abdominal pain.   The patient has the following cardiovascular/ medical conditions: Coronary artery disease with remote history of four-vessel bypass with recent intervention with 3 drug-eluting stents to the vein graft to the diagonal November 2024, ischemic cardiomyopathy with biventricular failure status post ICD, history of mitral valve repair, ascending aortic aneurysm, peripheral artery disease status post bilateral iliac stents, chronic obstructive of pulmonary disease, diabetes, hypertension and hyperlipidemia    Patient presented emergency department with symptoms of abdominal pain.  Symptoms occurred approximate 2 hours prior to hospital arrival associated vomiting and diarrhea.  He reports taking his medication, symptoms began and became more generalized and progressively worsened which prompted presentation to the emergency department.    He also reports some intestinal bleeding before due to an ulcer.  He reported some intermittent episodes of melena.  Hemoglobin stable.    Additionally, patient reported bilateral lower extremity pain and a chest pain syndrome.  Has extensive cardiovascular history including prior history of percutaneous intervention in 2017 and history of coronary artery bypass with a recent intervention of the vein graft to diagonal requiring 3 drug-eluting stents.  He is followed by my colleague, Dr. Gopi Henson, reports being placed on Ranexa but has been unable to obtain medication due to insurance reasons.  Chest pain chronic in  nature and unchanged.      ROS:     General: No fever or chills, No loss of appetite.    RS: No hemoptysis  GI: No melena or hematochezia  : No urinary disturbance  Derm: No skin disorders   Heme: No blood dyscrasias.  Remainder of 12 point systems reviewed and negative (or as mentioned in HPI)    Relevant Past Medical History:  Past Medical History:   Diagnosis Date    Asthma (CMD)     Cardiac pacemaker     Diabetes mellitus  (CMD)     Essential (primary) hypertension     High cholesterol       Past Surgical History:   Procedure Laterality Date    Cardiac catherization      Cardiac surgery      Coronary artery bypass graft          Social History:  Social History     Tobacco Use   Smoking Status Never   Smokeless Tobacco Never     Social History     Substance and Sexual Activity   Alcohol Use Not Currently     History   Drug Use Unknown     Comment: 2 times a week       Family History:   Family History   Problem Relation Age of Onset    Diabetes Mother     Heart disease Mother     Myocardial Infarction Mother     Diabetes Father     Heart disease Father     Heart disease Sister     Diabetes Brother        Inpatient Medications  Current Facility-Administered Medications   Medication Dose Route Frequency Provider Last Rate Last Admin    isosorbide mononitrate (IMDUR) ER tablet 30 mg  30 mg Oral Daily Charity Kramer MD   30 mg at 02/03/25 1002    colchicine (COLCRYS) tablet 0.6 mg  0.6 mg Oral Daily Charity Kramer MD        metoPROLOL succinate (TOPROL-XL) ER tablet 50 mg  50 mg Oral Daily Charity Kramer MD   50 mg at 02/03/25 1002    pantoprazole (PROTONIX INJECT) injection 40 mg  40 mg Intravenous Q12H Dustin Rodriguez MD        insulin glargine (LANTUS) injection 8 Units  8 Units Subcutaneous Nightly Dustin Rodriguez MD        insulin lispro (ADMELOG,HumaLOG) - Correction Dose   Subcutaneous Nightly Dustin Rodriguez MD        insulin lispro (ADMELOG,HumaLOG) - Correction  Dose   Subcutaneous TID WC Dustin Rodriguez MD        atorvastatin (LIPITOR) tablet 80 mg  80 mg Oral Nightly Dustin Rodriguez MD        DULoxetine (CYMBALTA) capsule 60 mg  60 mg Oral Daily Dustin Rodriguez MD   60 mg at 02/03/25 1002    [Held by provider] sacubitril-valsartan (ENTRESTO) 24-26 MG per tablet 1 tablet  1 tablet Oral BID Dustin Rodriguez MD        furosemide (LASIX INJECT) injection 40 mg  40 mg Intravenous Daily Dustin Rodriguez MD   40 mg at 02/03/25 1002    gabapentin (NEURONTIN) capsule 300 mg  300 mg Oral 3 times per day Dustin Rodriguez MD   300 mg at 02/03/25 0546    budesonide-formoterol (SYMBICORT) 160-4.5 MCG/ACT inhaler 2 puff  2 puff Inhalation BID Resp Dustin Rodriguez MD   2 puff at 02/03/25 0937    tamsulosin (FLOMAX) capsule 0.4 mg  0.4 mg Oral Daily PC Dustin Rodriguez MD   0.4 mg at 02/03/25 1002    ranolazine (RANEXA) 12 hr tablet 500 mg  500 mg Oral BID Dustin Rodriguez MD        Potassium Standard Replacement Protocol (Levels 3.5 and lower)   Does not apply See Admin Instructions Dustin Rodriguez MD        Magnesium Standard Replacement Protocol   Does not apply See Admin Instructions Dustin Rodriguez MD        sodium chloride 0.9 % injection 2 mL  2 mL Intracatheter 2 times per day Dustin Rodriguez MD   2 mL at 02/03/25 1004      Current Facility-Administered Medications   Medication Dose Route Frequency Provider Last Rate Last Admin      Current Facility-Administered Medications   Medication Dose Route Frequency Provider Last Rate Last Admin    ondansetron (ZOFRAN) injection 4 mg  4 mg Intravenous Q6H PRN Charity Kramer MD        HYDROcodone-acetaminophen (NORCO) 5-325 MG per tablet 1 tablet  1 tablet Oral Q6H PRN Dustin Rodriguez MD   1 tablet at 02/03/25 1002    morphine injection 2 mg  2 mg Intravenous Q4H PRN Dustin Rodriguez MD   2 mg at 02/03/25 0407    dextrose 50 % injection 25 g  25 g Intravenous PRN Dustin Rodriguez MD        dextrose 50 % injection 12.5 g   12.5 g Intravenous PRN Dustin Rodriguez MD        dextrose (GLUTOSE) 40 % gel 15 g  15 g Oral PRN Dustin Rodriguez MD        ipratropium-albuterol (DUONEB) 0.5-2.5 (3) MG/3ML nebulizer solution 3 mL  3 mL Nebulization Q6H Resp PRN Dustin Rodriguez MD        acetaminophen (TYLENOL) tablet 650 mg  650 mg Oral Q4H PRN Dustin Rodrgiuez MD        Or    acetaminophen (TYLENOL) suppository 650 mg  650 mg Rectal Q4H PRN Dustin Rodriguez MD        polyethylene glycol (MIRALAX) packet 17 g  17 g Oral Daily PRN Dustin Rodriguez MD        melatonin tablet 6 mg  6 mg Oral Nightly PRN Dustin Rodriguez MD        calcium carbonate (TUMS) chewable tablet 500 mg  500 mg Oral Q4H PRN Dustin Rodriguez MD        aluminum-magnesium hydroxide-simethicone (MAALOX) 200-200-20 MG/5ML suspension 30 mL  30 mL Oral Q4H PRN Dustin Rodriguez MD        sodium chloride 0.9 % injection 10 mL  10 mL Intravenous PRN Dustin Rodriugez MD            Allergy   ALLERGIES:   Allergen Reactions    Banana   (Food And Med) SWELLING and HIVES            Examination:      Vital Last Value 24 Hour Range   Temperature 98.1 °F (36.7 °C) (02/03/25 1100) Temp  Min: 95.3 °F (35.2 °C)  Max: 98.1 °F (36.7 °C)   Pulse 70 (02/03/25 1100) Pulse  Min: 70  Max: 90   Respiratory 18 (02/03/25 1100) Resp  Min: 14  Max: 20   Non-Invasive  Blood Pressure 135/88 (02/03/25 1100) BP  Min: 126/85  Max: 156/95   Pulse Oximetry 98 % (02/03/25 1100) SpO2  Min: 93 %  Max: 100 %   Arterial   Blood Pressure   No data recorded     Tele: Normal sinus rhythm    No intake or output data in the 24 hours ending 02/03/25 1119     Weight    02/02/25 1709   Weight: 74.9 kg (165 lb 2 oz)         GENERAL: No apparent distress  HEENT: Normocephalic.  Neck:  Supple neck.   Oral mucosa : Pink and moist.    Endocrine: There is no goiter.  CVS: Normal first and second heart tones.   Lung fields: Clear to auscultation bilaterally.   GI: Soft. Nontender, nondistended.    Lower extremity: No cyanosis,  48M with pmhx of HIV (compliant with Biktarvy), partial great toe amputation in Jan 2022, recent R great toe OM in May (s/p 6 week course of Cefepime and Vancomycin), DM, HTN who presents for a 1 day history of right foot pain and swelling. Pt notes he has an open wound to the R great toe that he has had for the past 3 months. He was recently admitted to a hospital in [place] for OM of the R great toe and received a 6 week course of Vanc and Cefepime. Pt reports he then followed up with his podiatrist who completed an additional xray, which showed improvement in the infection. Pt states approximately 2 weeks ago he then developed slight drainage from the wound and followed up with his Podiatrist, who prescribed a 5 day course of Bactrim, which he completed. He then performed wound care himself the past 2 weeks, however two days ago, he said the wound was exposed to water and he subsequently developed pain and swelling, which is what prompted him to come to the ED. Denies fevers, chills, chest pain, shortness of breath, abdominal pain, n/v/d, numbness/tingling in the leg. Denies any bleeding or draining from the wound over the last few days. clubbing or edema.   Peripheral vascular: Both lower extremities are warm and well perfused.    Neuro: Awake and alert.  Nonfocal examination.  Psych: Appropriate mood and affect  Integumentary: Warm and Dry      Clinical Data:       The following were personally visualized and interpreted by me:    LABS:    CBC  Recent Labs   Lab 02/03/25  0253 02/02/25  1650   WBC 9.8 8.9   HCT 43.5 44.3   HGB 14.9 15.3    239       CMP  Recent Labs   Lab 02/03/25  0846 02/03/25  0528 02/02/25  1650   SODIUM 140 138 144   POTASSIUM 5.4* 5.7* 4.7   CHLORIDE 104 105 106   CO2 25 23 24   GLUCOSE 131* 137* 156*   BUN 22* 21* 15   CREATININE 1.71* 1.57* 1.21*   CALCIUM 9.3 9.5 10.1   TOTPROTEIN  --  8.2 8.7*   ALBUMIN  --  4.1 4.6   BILIRUBIN  --  0.6 0.6   AST  --  23 33   GPT  --  44 58   ALKPT  --  201* 220*       Cardiac Labs  Recent Labs   Lab 02/03/25  0253 02/02/25  2233 02/02/25  1931 02/02/25  1650   HTROPI 32 24 21 19   NTPROB  --   --   --  1,865*       Lipid Panel  No results found    Coags  Recent Labs   Lab 02/02/25  1650   INR 1.0   PTT 27       ABG  No results found    IMAGING:    ECG:   Encounter Date: 02/02/25   Electrocardiogram 12-Lead   Result Value    Ventricular Rate EKG/Min (BPM) 81    Atrial Rate (BPM) 81    WI-Interval (MSEC) 180    QRS-Interval (MSEC) 108    QT-Interval (MSEC) 426    QTc 494    P Axis (Degrees) 79    R Axis (Degrees) 79    T Axis (Degrees) 4    REPORT TEXT      Normal sinus rhythm  Possible  Left atrial enlargement  Minimal voltage criteria for LVH, may be normal variant  (  Junior product  )  T wave abnormality, consider lateral ischemia  Prolonged QT  Abnormal ECG        Chest x-ray:  IMPRESSION:     No acute cardiopulmonary findings.    CTA chest, abdomen and pelvis:  IMPRESSION:  1. Possible acute bleeding into the third portion duodenum versus  radiopaque bowel content. More distally, there is more concentrated  radiopaque bowel content which is likely ingested material such as  antacid.  2. Mild dilatation of proximal jejunum likely ileus.  3. Right renal lesion which may be a proteinaceous cyst or a neoplasm.  Recommend ultrasound.  4. Nonvisualized proximal left vertebral artery potentially occluded. If  relevant, could be further investigated by dedicated CTA head and neck.  5. Pelvic atherosclerotic stenoses and occlusions as described above.  Patent bilateral aortoiliac stents.  6. Cardiomegaly with pacemaker.  7. Esophageal mural thickening distally consistent with esophagitis.  8. Mild prostate enlargement with mild bladder wall thickening suggesting  chronic outlet obstruction.  9. Small inguinal hernias of only fat.  10. Slight fibrosis or atelectasis in lower lungs.  10. Diverticulosis.  11. C6-7 spondylosis.  12. Right glenohumeral osteoarthritis.  13. No evidence of aortic dissection or aneurysm and no evidence of  pulmonary embolism.  Echocardiogram:  Results for orders placed during the hospital encounter of 24    TRANSTHORACIC ECHO(TTE) COMPLETE W/ W/O IMAGING AGENT    Narrative  *[facility]*  [address] [id number] [phone]  Transthoracic Echocardiogram (TTE)    Patient: Neo Munoz  Study Date/Time:            2024 1:59PM  MRN:     [id number]      FIN#:                       [id number]  :     1963   Ht/Wt:                      167.6cm 81.7kg  Age:     60           BSA/BMI:                    1.97m^2 29.1kg/m^2  Gender:  M            Baseline BP:                110 / 65  *Ordering Physician:* Juan José Stein    *Attending Physician:* Juan José Stein    *Diagnostic Physician:* Zach Sutherland MD  *Sonographer:* Radha Qureshi    ------------------------------------------------------------------------------  INDICATIONS:   Chest pain.    ------------------------------------------------------------------------------  STUDY CONCLUSIONS  SUMMARY:    1. Left ventricle: The cavity size is dilated. Wall thickness is  normal.  Systolic function is severely reduced. The ejection fraction was measured  by visual estimation. Doppler parameters are consistent with abnormal left  ventricular relaxation and increased filling pressure (grade 2 diastolic  dysfunction). The ejection fraction is 20%.  2. Mitral valve: An annuloplasty ring is present.  3. Right ventricle: The cavity size is normal. Systolic function is reduced.  Pacemaker lead noted in right ventricle.  4. Right atrium: Pacemaker lead noted in right atrium.  5. Tricuspid valve: There is mild regurgitation.  6. Pericardium, extracardiac: There is no pericardial effusion.    ------------------------------------------------------------------------------  STUDY DATA:  Patient room number: ED 3.  Procedure:  A transthoracic  echocardiogram was performed. Image quality was good. Intravenous contrast  (Definity 2ml) was administered to opacify the left ventricle.  M-mode,  complete 2D, complete spectral Doppler, and color Doppler.  Study status:  STAT.  Study completion:  There were no complications.    FINDINGS    LEFT VENTRICLE:  The cavity size is dilated. Wall thickness is normal.  Systolic function is severely reduced. There is severe diffuse hypokinesis.  The ejection fraction was measured by visual estimation. The ejection fraction  is 20%. The tissue Doppler parameters are abnormal. Doppler parameters are  consistent with abnormal left ventricular relaxation and increased filling  pressure (grade 2 diastolic dysfunction).    AORTIC VALVE:  The annulus is normal. The valve is trileaflet. The leaflets  are normal thickness. Velocity is within the normal range. There is no  stenosis. There is no regurgitation.    AORTA:  Aortic root: The root is normal-sized.    MITRAL VALVE:  An annuloplasty ring is present. The annulus is mildly  calcified. The leaflets are normal thickness. Inflow velocity is within the  normal range. There is no evidence for stenosis. There is no  regurgitation.  The mean diastolic gradient is 3mm Hg. The peak diastolic gradient is 5mm Hg.  The valve area by pressure half-time is 2.0cm^2. The valve area index by  pressure half-time is 0.99cm^2/m^2.    LEFT ATRIUM:  The atrium is normal in size.    RIGHT VENTRICLE:  The cavity size is normal. Systolic function is reduced. The  TAPSE is reduced, suggestive of RV systolic dysfunction.  Systolic pressure is  mildly increased.   Pacemaker lead noted in right ventricle.       The RV  pressure during systole is 37mm Hg.    PULMONIC VALVE:   The valve is structurally normal. Velocity is within the  normal range. There is no evidence for stenosis. There is trivial  regurgitation.    TRICUSPID VALVE:  The valve is structurally normal. Inflow velocity is within  the normal range. There is no evidence for stenosis. There is mild  regurgitation.    RIGHT ATRIUM:  The atrium is normal in size. Pacemaker lead noted in right  atrium.       The estimated central venous pressure is 3mm Hg.    PERICARDIUM:   There is no pericardial effusion.    SYSTEMIC VEINS:  Inferior vena cava: The IVC is normal-sized.  Respirophasic diameter changes  are in the normal range (>= 50%).    ------------------------------------------------------------------------------  Measurements    Left ventricle            Value        Ref        12/23/2022  Left atrium              Value          Ref       12/23/2022  JULIOCESAR, LAX chord        (H) 5.9   cm     4.2 - 5.8  5.2         AP dim, ES           (N) 4.0   cm       3.0 - 4.0 4.5  ESD, LAX chord        (H) 5.3   cm     2.5 - 4.0  5.1         AP dim index, ES     (N) 2.0   cm/m^2   1.5 - 2.3 2.5  JULIOCESAR/bsa, LAX chord    (N) 3.0   cm/m^2 2.2 - 3.0  2.9         Area ES, A4C         (N) 17    cm^2     <=20      18  ESD/bsa, LAX chord    (H) 2.7   cm/m^2 1.3 - 2.1  2.8         Area/bsa ES, A4C         8.67  cm^2/m^2 --------- 9.93  PW, ED, LAX           (N) 1.0   cm     0.6 - 1.0  1.2         Area ES, A2C              17    cm^2     --------- ----------  JULIOCESAR major ax, A4C         9.8   cm     ---------- 7.7         Area/bsa ES, A2C         8.67  cm^2/m^2 --------- ----------  ESD major ax, A4C         9.0   cm     ---------- 7.8         SI dim, A2C              5.4   cm       --------- ----------  FS major axis, A4C        9     %      ---------- -1          Vol, S               (N) 44    ml       18 - 58   46  JULIOCESAR/bsa major ax, A4C     5.0   cm/m^2 ---------- 4.3         Vol/bsa, S           (N) 22    ml/m^2   16 - 34   26  ESD/bsa major ax, A4C     4.5   cm/m^2 ---------- 4.4         Vol, ES, 1-p A4C     (N) 44    ml       18 - 58   46  DANAE, A4C                  47.9  cm^2   ---------- 31.9        Vol/bsa, ES, 1-p A4C (N) 22    ml/m^2   12 - 37   26  CAITLIN, A4C                  42.6  cm^2   ---------- 28.3        Vol, ES, 1-p A2C     (N) 44    ml       18 - 58   ----------  FAC, A4C                  11    %      ---------- 11          Vol/bsa, ES, 1-p A2C (N) 22    ml/m^2   11 - 43   ----------  IVS, ED               (N) 0.8   cm     0.6 - 1.0  1.4         Vol, ES, 2-p             44    ml       --------- ----------  PW, ED                (N) 0.9   cm     0.6 - 1.0  1.2         Vol/bsa, ES, 2-p     (N) 22    ml/m^2   16 - 34   ----------  IVS/PW, ED                0.86         ---------- 1.12  EDV                   (H) 201   ml     62 - 150   144         Mitral valve             Value          Ref       12/23/2022  ESV                   (H) 149   ml     21 - 61    130         Mean v, D                0.79  m/sec    --------- 0.52  EF                    (L) 20    %      52 - 72    10          Peak E                   1.12  m/sec    --------- ----------  SV                        36    ml     ---------- 41          Peak A                   1.05  m/sec    --------- ----------  EDV/bsa               (H) 102   ml/m^2 34 - 74    81          VTI leaflet coapt        39.4  cm       --------- 24.6  ESV/bsa               (H) 76     ml/m^2 11 - 31    73          Decel time               331   ms       --------- ----------  SV/bsa                    18    ml/m^2 ---------- 23          PHT                      126   ms       --------- 63  ESV, 1-p A4C          (H) 130   ml     22 - 78    ----------  Mean grad, D             3     mm Hg    --------- 1  SV, 1-p A4C               29    ml     ---------- 22          Peak grad, D             5     mm Hg    --------- ----------  ESV/bsa, 1-p A4C      (H) 66    ml/m^2 12 - 40    ----------  Peak E/A ratio           1.1            --------- ----------  SV/bsa, 1-p A4C           15    ml/m^2 ---------- 12          A-VTI                    38.2  cm       --------- 22.6  EDV, 2-p              (H) 188   ml     62 - 150   ----------  MVA, PHT                 2.0   cm^2     --------- 3.0  ESV, 2-p              (H) 150   ml     21 - 61    85          MVA/bsa, PHT             0.99  cm^2/m^2 --------- 1.69  EF, 2-p               (L) 20    %      52 - 72    ----------  SV, 2-p                   38    ml     ---------- ----------  Tricuspid valve          Value          Ref       12/23/2022  EDV/bsa, 2-p          (H) 95    ml/m^2 34 - 74    ----------  TR peak v            (H) 2.9   m/sec    <=2.8     1.9  ESV/bsa, 2-p          (H) 76    ml/m^2 11 - 31    48          Peak RV-RA grad, S       34    mm Hg    --------- 14  SV/bsa, 2-p               19.3  ml/m^2 ---------- ----------  E', lat doc, TDI      (L) 5.4   cm/sec >=10.0     ----------  Ascending aorta          Value          Ref       12/23/2022  E/e', lat doc, TDI    (H) 21           <=13       ----------  AAo AP diam, ED      (N) 2.6   cm       2.2 - 3.8 ----------  E', med doc, TDI      (L) 4.0   cm/sec >=7.0      ----------  AAo AP diam/bsa, ED  (N) 1.3   cm/m^2   1.1 - 1.9 ----------  E/e', med doc, TDI        28           ---------- ----------  E', avvincent, TDI              4.735 cm/sec ---------- ----------  Pulmonary artery         Value          Ref        12/23/2022  E/e', avg, TDI        (H) 24           <=14       ----------  Pressure, S              37    mm Hg    --------- 22    Right ventricle           Value        Ref        12/23/2022  Systemic veins           Value          Ref       12/23/2022  JULIOCESAR, LAX                  3.3   cm     ---------- 2.6         Estimated CVP            3     mm Hg    --------- 8  TAPSE, MM             (L) 1.3   cm     >=1.7      ----------  Pressure, S               37    mm Hg  ---------- 22  S' lateral            (N) 7.4   cm/sec 6.0 - 13.4 ----------  Legend:  (L)  and  (H)  brittny values outside specified reference range.    (N)  marks values inside specified reference range.    Prepared and electronically signed by  Zach Sutherland MD  11/12/2024 15:03     Cardiac MRI stress November 2024:  IMPRESSION:   1. During hyperemia, there is perfusion defect noted in the basal-mid inferior and   inferolateral walls which is unchanged from rest/stress and corresponds to the area of   transmural LGE, consistent with prior myocardial infarction. Additionally, there is a   small perfusion defect in the basal-mid anterior wall on stress which is not present on   stress, consistent with ischemia.   2. There is near-transmural LGE in the basal-mid inferior and inferolateral walls   suggestive of prior myocardial infarction (LCx vs RCA territory) which is non-viable   3. Additionally, there is a second territory of near-transmural LGE in the mid   anteroseptal, distal septal, and apical walls suggestive of prior myocardial infarction   (distal LAD) with low probability of viability in these segments.   4. There is moderate asymmetrical left ventricular hypertrophy, maximal thickness is 14 mm   in the basal anteroseptum.   5. Left ventricle is severely dilated and with severely decreased systolic function.  LVEF   =14%.   6. Right ventricle is normal in size and with mildly decreased systolic function.   RVEF=43%.     Overall, findings are  suggestive of ischemic cardiomyopathy with non-viable myocardial   infarction LGE involving the distal LAD and LCx vs RCA territory; compared to the prior   CMR in 2019, the extent and severity of LGE is grossly unchanged.  There is additionally a   small territory of ischemia in the basal-mid anterior wall which is new from prior.     Cath Report:  Cardiac Diagnostic Report    Patient name                 KENNA VARELA           1963    Patient ID (UP)             [id number]     Accession #   [id number]   Study Date:2022    Diagnostic procedure: Left Heart Catheterization , Coronary    Angiogram, SVG Angiography, Internal Mammary Artery Graft   Diagnostic Cath Status: Urgent Heart Rate: 75 bpm    Conclusions    Procedure Summary    Neo Munoz was referred for cardiac catheterization to evaluate    coronary anatomy after discussing with the patient risks and    benefits of the procedure including but not limited to bleeding    infection vascular complications stroke that the need for emergent    surgery patient agreed and signed informed consent.    Procedure:    1. Real-time ultrasound-guided right common femoral artery access    with supervision interpretation.    2. Conscious sedation starting time at  finishing at 8:38 p.m.    using 1 mg Versed and 25 mcg of fentanyl monitored and administered    by registered nurse Ja Husain and supervised by me during the    entire procedure.    3. Left heart catheterization.    4. Coronary angiography.    5. Saphenous vein angiography.    6. Left internal artery mammary angiography.    7. Perclose to the right common femoral artery after femoral    angiography.    Procedural Conclusions:    Patent LIMA to LAD.    Patent vein graft to the obtuse marginal.    Non dominant right coronary artery.    99% mid native LAD stenosis. Patent stent in the proximal LAD.    Occluded stent to the diagonal branch    Perclose to the right common femoral artery.    Plan:     Medical therapy.    Discharge plan in the morning    Protocol:    Allergies:    Allergies    Description Type Start Date End Date Comment Verified     Banana Allergy 15-Dec-2022 Reactions: Hives, Swelling Graciela Pompa RN    Contrast Load:    Procedure Details    Patient came to cath lab the right groin was prepped and draped    ultrasound-guided right common femoral artery access with a    micropuncture wire and micropuncture needle was done and long wire    was placed and exchanged to 6 Pakistani sheath through the long wire we    started JL4 6 Pakistani diagnostic catheter left coronary intubated and    imaged I exchanged to JR4 6 Pakistani diagnostic catheter LVEDP and    manual pullback and the right coronary intubated and imaged I    exchanged to IM catheter and internal mammary artery intubated and    imaged.    Hemodynamic:    Patient was sinus rhythm heart rate 65 beats per minute. His aortic    pressure 9/59 his LV pressure 102 left ventricular end-diastolic    pressure 11 mm Hg no significant gradient seen across aortic valve.    Coronary angiography:    The left main no significant stenosis. The left anterior descending    artery has patent stent proximal part extending through the diagonal    branch which was occluded stent in the diagonal in the mid LAD 99%    and there is competitive flow seen from the internal mammary artery.    The left circumflex is large dominant vessel with occluded obtuse    marginal and some minimal disease in the distal PDA. The right    coronary artery is small non dominant vessel with proximal disease    in the range of 89%,    Saphenous vein graft to the high obtuse marginal or ramus    intermedius appears patent has a 40 50% stenosis in the mid vein    graft with good runoff to the vein graft.    Internal mammary artery is large connected to the LAD lad has a    distal apical stenosis in the range of 30-40% with good runoff and    good touchdown.    The right common  femoral artery imaged fluoroscopy and Perclose    suture device deployed without any hematoma or bleed.    Findings    Hemodynamics AO = 99/59 mm Hg    LV = 101 left ventricular end-diastolic pressure 11 mm Hg no    significant gradient seen across aortic valve    Left Main Angiographically normal    RCA Small non dominant vessel with stenosis in the proximal part    LAD Patent stent in the proximal LAD occluded diagonal branch stent    and competitive flow in the mid LAD from the LIMA. With 99% mid LAD    stenosis    Circ Dominant vessel with minimal luminal irregularity    SVG(s) Patent saphenous vein graft to high obtuse marginal with 40%    stenosis in the saphenous vein graft    TIARRA Patent LIMA to the LAD with good runoff and good touchdown and    distal apical LAD has 30-40% stenosis    LV Not done    Benito Lopez MD    12/16/2022    8:42 PM CST    Recommendations    Medical therapy    -----------------------------------------------------------    Electronically signed by Jessica Oliver(Performing    Physician) on 12/16/2022 09:06 PM     Twelve-lead EKG:      Assessment/Plans:     Peripheral artery disease  -Now with prior history of percutaneous intervention in the past in 2017.  Reporting symptoms of claudication  -No critical or acute limb ischemia  -Will obtain arterial duplex    Chronic chest pain  Coronary artery disease with history of bypass and recent stenting of the vein graft of the diagonal in November 2024  -Enzymes reassuring.  Recent intervention of the vein graft of the diagonal artery in November 2024  -Unfortunately admission twelve-lead EKG no ischemic changes  -Nonischemic cardiomyopathy with severely reduced left ventricular function.  Now status post ICD  -Was prescribed Ranexa but patient reports difficulty in obtaining prescription due to insurance reason  -Continue risk factor modification.  Continue medical therapy with Imdur, aspirin, atorvastatin metoprolol.  Social service to assist  with Ranexa.    Ischemic cardiomyopathy with chronic biventricular heart failure  NYHA III AHA C   -Compensated  -Recent  echocardiogram with ejection fraction of 20% with severely reduced systolic function, dilated cavity size and grade 2 diastolic dysfunction; RV reduced systolic function.   Current GDMT:  - Diuretic: None  - BB: Metoprolol 25 mg QD  - RAASi: None  - MRA:  Aldactone  - SGLT2i: Jardiance  - Hydral/Nitrate: Isordil 30 mg QD  - Inotrope:  None  - AA: None  - AC: Ticagrelor 90 mg BID  - Other:  Atorvastatin 80 mg QD  Device therapy: ICD     Essential hypertension  -Blood pressures controlled     Hyperlipidemia  -Check lipid profile  -Continue atorvastatin     Diabetes  -Continue atorvastatin  -With an acute kidney injury hyperkalemia so Entresto currently on hold.  Will plan to resume at discharge  -Will defer workup and management to primary care services    Remote history of cerebrovascular accident  -Continue aspirin and atorvastatin      Asthma  -Continue Symbicort.      Acute kidney injury  -Suspect prerenal.  Patient reports multiple episodes of diarrhea and vomiting  -Recommend gentle IV resuscitation.  Avoid nephrotoxins.    Abdominal pain  Reported melena  -Intermittent dark stools but stable hemoglobin  -Upper endoscopy with mild gastritis  -Appreciate GI input    Thank you for allowing us to participate in this patient's care.  Please do not hesitate to call with any questions or concerns.